# Patient Record
Sex: MALE | Race: WHITE | NOT HISPANIC OR LATINO | Employment: FULL TIME | URBAN - METROPOLITAN AREA
[De-identification: names, ages, dates, MRNs, and addresses within clinical notes are randomized per-mention and may not be internally consistent; named-entity substitution may affect disease eponyms.]

---

## 2018-09-05 ENCOUNTER — RECORDS - HEALTHEAST (OUTPATIENT)
Dept: LAB | Facility: CLINIC | Age: 28
End: 2018-09-05

## 2018-09-05 LAB
ALBUMIN SERPL-MCNC: 4 G/DL (ref 3.5–5)
ALP SERPL-CCNC: 59 U/L (ref 45–120)
ALT SERPL W P-5'-P-CCNC: 24 U/L (ref 0–45)
ANION GAP SERPL CALCULATED.3IONS-SCNC: 8 MMOL/L (ref 5–18)
AST SERPL W P-5'-P-CCNC: 24 U/L (ref 0–40)
BILIRUB SERPL-MCNC: 0.6 MG/DL (ref 0–1)
BUN SERPL-MCNC: 15 MG/DL (ref 8–22)
CALCIUM SERPL-MCNC: 9.8 MG/DL (ref 8.5–10.5)
CHLORIDE BLD-SCNC: 107 MMOL/L (ref 98–107)
CHOLEST SERPL-MCNC: 150 MG/DL
CO2 SERPL-SCNC: 27 MMOL/L (ref 22–31)
CREAT SERPL-MCNC: 0.85 MG/DL (ref 0.7–1.3)
FASTING STATUS PATIENT QL REPORTED: NORMAL
GFR SERPL CREATININE-BSD FRML MDRD: >60 ML/MIN/1.73M2
GLUCOSE BLD-MCNC: 79 MG/DL (ref 70–125)
HDLC SERPL-MCNC: 56 MG/DL
LDLC SERPL CALC-MCNC: 75 MG/DL
POTASSIUM BLD-SCNC: 4.1 MMOL/L (ref 3.5–5)
PROT SERPL-MCNC: 7.1 G/DL (ref 6–8)
SODIUM SERPL-SCNC: 142 MMOL/L (ref 136–145)
TRIGL SERPL-MCNC: 95 MG/DL
TSH SERPL DL<=0.005 MIU/L-ACNC: 0.62 UIU/ML (ref 0.3–5)

## 2020-07-28 ENCOUNTER — HOSPITAL ENCOUNTER (OUTPATIENT)
Dept: GENERAL RADIOLOGY | Facility: CLINIC | Age: 30
Discharge: HOME | End: 2020-07-28
Attending: INTERNAL MEDICINE | Admitting: INTERNAL MEDICINE
Payer: COMMERCIAL

## 2020-07-28 DIAGNOSIS — K21.9 GASTROESOPHAGEAL REFLUX DISEASE WITHOUT ESOPHAGITIS: ICD-10-CM

## 2020-07-28 DIAGNOSIS — R14.2 ERUCTATION: ICD-10-CM

## 2020-07-28 PROCEDURE — 74220 X-RAY XM ESOPHAGUS 1CNTRST: CPT

## 2020-07-28 PROCEDURE — 25500045 ZZH RX 255: Performed by: INTERNAL MEDICINE

## 2020-07-28 RX ADMIN — ANTACID/ANTIFLATULENT 4 G: 380; 550; 10; 10 GRANULE, EFFERVESCENT ORAL at 09:31

## 2020-08-18 ENCOUNTER — ANCILLARY PROCEDURE (OUTPATIENT)
Dept: GENERAL RADIOLOGY | Facility: CLINIC | Age: 30
End: 2020-08-18
Attending: PREVENTIVE MEDICINE
Payer: COMMERCIAL

## 2020-08-18 ENCOUNTER — OFFICE VISIT (OUTPATIENT)
Dept: URGENT CARE | Facility: URGENT CARE | Age: 30
End: 2020-08-18
Payer: COMMERCIAL

## 2020-08-18 VITALS
DIASTOLIC BLOOD PRESSURE: 76 MMHG | SYSTOLIC BLOOD PRESSURE: 120 MMHG | WEIGHT: 133.3 LBS | OXYGEN SATURATION: 100 % | HEART RATE: 65 BPM | TEMPERATURE: 98.8 F

## 2020-08-18 DIAGNOSIS — M79.674 PAIN OF TOE OF RIGHT FOOT: Primary | ICD-10-CM

## 2020-08-18 DIAGNOSIS — M79.674 PAIN OF TOE OF RIGHT FOOT: ICD-10-CM

## 2020-08-18 PROCEDURE — 73660 X-RAY EXAM OF TOE(S): CPT | Mod: RT

## 2020-08-18 PROCEDURE — 99204 OFFICE O/P NEW MOD 45 MIN: CPT | Performed by: PREVENTIVE MEDICINE

## 2020-08-18 RX ORDER — PANTOPRAZOLE SODIUM 40 MG/1
TABLET, DELAYED RELEASE ORAL
COMMUNITY
Start: 2020-06-22 | End: 2024-07-31

## 2020-08-18 RX ORDER — FAMOTIDINE 20 MG/1
TABLET, FILM COATED ORAL
COMMUNITY
Start: 2020-07-22 | End: 2020-12-15

## 2020-08-18 NOTE — PROGRESS NOTES
SUBJECTIVE:  Chief Complaint   Patient presents with     Urgent Care     Toe Injury     probably stub his toe on Saturday afternoon and didn't really hurt until Sunday night. Notice the pain more on Sunday night. Tingling sensation, swelling today     Oniel Smart is a 30 year old adult presents with a chief complaint of right toe(s) great pain.  The injury occurred 2 day(s) ago.   The injury happened while at home. How: stubbed toeimmediate pain.  The patient complained of mild pain  and has had decreased ROM.  Pain exacerbated by weight-bearing and movement.  Relieved by nothing.  Oniel Smart treated it initially with ice, Tylenol and Ibuprofen. This is the first time this type of injury has occurred to this patient.     PMH - GERD    Current Outpatient Medications   Medication Sig Dispense Refill     famotidine (PEPCID) 20 MG tablet        pantoprazole (PROTONIX) 40 MG EC tablet        Social History     Tobacco Use     Smoking status: Never Smoker     Smokeless tobacco: Never Used   Substance Use Topics     Alcohol use: Not on file   No alcohol, no drug use    FH - no osteoporosis    ROS:  Review of systems negative except as stated above.    EXAM:   /76   Pulse 65   Temp 98.8  F (37.1  C) (Tympanic)   Wt 60.5 kg (133 lb 4.8 oz)   SpO2 100%   Gen: healthy,alert,no distress  Extremity: toe(s) great has point tenderness over distal end of toe, FROM and pain with resisted extension.   There is no compromise to the distal circulation. No bruising, minimal swelling, no subungual hematoma, toenail looks fine.  Skin no red or warm.  Nl cap refill, sensation.  Nl cme in toe  Pulses are +2 and CRT is brisk  GENERAL APPEARANCE: healthy, alert and no distress  EXTREMITIES: peripheral pulses normal  SKIN: no suspicious lesions or rashes  NEURO: Normal strength and tone, sensory exam grossly normal, mentation intact and speech normal    X-RAY was done of r great toe.  Tiny avulsion fracture proximal distal  phalanx    ASSESSMENT:   R great toe avulsion fracture  Advise RICE, tylenol, ken tape, post op shoe.  Follow up with orthopedics in next 1-2 days for definitive care.

## 2020-12-14 ASSESSMENT — PATIENT HEALTH QUESTIONNAIRE - PHQ9
SUM OF ALL RESPONSES TO PHQ QUESTIONS 1-9: 11
10. IF YOU CHECKED OFF ANY PROBLEMS, HOW DIFFICULT HAVE THESE PROBLEMS MADE IT FOR YOU TO DO YOUR WORK, TAKE CARE OF THINGS AT HOME, OR GET ALONG WITH OTHER PEOPLE: VERY DIFFICULT
SUM OF ALL RESPONSES TO PHQ QUESTIONS 1-9: 11

## 2020-12-14 ASSESSMENT — ANXIETY QUESTIONNAIRES
2. NOT BEING ABLE TO STOP OR CONTROL WORRYING: MORE THAN HALF THE DAYS
GAD7 TOTAL SCORE: 11
7. FEELING AFRAID AS IF SOMETHING AWFUL MIGHT HAPPEN: NOT AT ALL
4. TROUBLE RELAXING: MORE THAN HALF THE DAYS
5. BEING SO RESTLESS THAT IT IS HARD TO SIT STILL: NOT AT ALL
GAD7 TOTAL SCORE: 11
6. BECOMING EASILY ANNOYED OR IRRITABLE: NEARLY EVERY DAY
3. WORRYING TOO MUCH ABOUT DIFFERENT THINGS: MORE THAN HALF THE DAYS
GAD7 TOTAL SCORE: 11
7. FEELING AFRAID AS IF SOMETHING AWFUL MIGHT HAPPEN: NOT AT ALL
1. FEELING NERVOUS, ANXIOUS, OR ON EDGE: MORE THAN HALF THE DAYS

## 2020-12-15 ENCOUNTER — OFFICE VISIT (OUTPATIENT)
Dept: PEDIATRICS | Facility: CLINIC | Age: 30
End: 2020-12-15
Payer: COMMERCIAL

## 2020-12-15 VITALS
TEMPERATURE: 99.1 F | DIASTOLIC BLOOD PRESSURE: 88 MMHG | WEIGHT: 129.7 LBS | HEART RATE: 58 BPM | OXYGEN SATURATION: 100 % | SYSTOLIC BLOOD PRESSURE: 140 MMHG

## 2020-12-15 DIAGNOSIS — F41.9 ANXIETY: Primary | ICD-10-CM

## 2020-12-15 DIAGNOSIS — K22.4 ESOPHAGEAL DYSMOTILITY: ICD-10-CM

## 2020-12-15 DIAGNOSIS — K21.9 GASTROESOPHAGEAL REFLUX DISEASE, UNSPECIFIED WHETHER ESOPHAGITIS PRESENT: ICD-10-CM

## 2020-12-15 PROCEDURE — 99214 OFFICE O/P EST MOD 30 MIN: CPT | Performed by: FAMILY MEDICINE

## 2020-12-15 PROCEDURE — 96127 BRIEF EMOTIONAL/BEHAV ASSMT: CPT | Performed by: FAMILY MEDICINE

## 2020-12-15 RX ORDER — FAMOTIDINE 40 MG/1
TABLET, FILM COATED ORAL
COMMUNITY
Start: 2020-10-03 | End: 2024-07-31

## 2020-12-15 RX ORDER — NORTRIPTYLINE HCL 25 MG
CAPSULE ORAL
COMMUNITY
Start: 2020-11-23 | End: 2020-12-15 | Stop reason: ALTCHOICE

## 2020-12-15 RX ORDER — ESCITALOPRAM OXALATE 10 MG/1
10 TABLET ORAL DAILY
Qty: 30 TABLET | Refills: 2 | Status: SHIPPED | OUTPATIENT
Start: 2020-12-15 | End: 2021-02-08

## 2020-12-15 SDOH — HEALTH STABILITY: MENTAL HEALTH: HOW OFTEN DO YOU HAVE 6 OR MORE DRINKS ON ONE OCCASION?: NOT ASKED

## 2020-12-15 SDOH — HEALTH STABILITY: MENTAL HEALTH: HOW MANY STANDARD DRINKS CONTAINING ALCOHOL DO YOU HAVE ON A TYPICAL DAY?: NOT ASKED

## 2020-12-15 SDOH — HEALTH STABILITY: MENTAL HEALTH: HOW OFTEN DO YOU HAVE A DRINK CONTAINING ALCOHOL?: 2-3 TIMES A WEEK

## 2020-12-15 ASSESSMENT — PATIENT HEALTH QUESTIONNAIRE - PHQ9: SUM OF ALL RESPONSES TO PHQ QUESTIONS 1-9: 11

## 2020-12-15 ASSESSMENT — ANXIETY QUESTIONNAIRES: GAD7 TOTAL SCORE: 11

## 2020-12-15 NOTE — PROGRESS NOTES
Subjective     Oniel Smart is a 30 year old male who presents to clinic today for the following health issues:    History of Present Illness       Mental Health Follow-up:  Patient presents to follow-up on Depression & Anxiety.Patient's depression since last visit has been:  Medium  The patient is having other symptoms associated with depression.  Patient's anxiety since last visit has been:  Bad  The patient is having other symptoms associated with anxiety.  Any significant life events: health concerns  Patient is feeling anxious or having panic attacks.  Patient has no concerns about alcohol or drug use.     Social History  Tobacco Use    Smoking status: Never Smoker    Smokeless tobacco: Never Used  Alcohol use: Not on file  Drug use: Not on file      Today's PHQ-9         PHQ-9 Total Score:     (P) 11   PHQ-9 Q9 Thoughts of better off dead/self-harm past 2 weeks :   (P) Not at all   Thoughts of suicide or self harm:      Self-harm Plan:        Self-harm Action:          Safety concerns for self or others:           He eats 2-3 servings of fruits and vegetables daily.He consumes 0 sweetened beverage(s) daily.He exercises with enough effort to increase his heart rate 30 to 60 minutes per day.  He exercises with enough effort to increase his heart rate 5 days per week. He is missing 1 dose(s) of medications per week.  He is not taking prescribed medications regularly due to remembering to take.  Answers for HPI/ROS submitted by the patient on 12/14/2020   Chronic problems general questions HPI Form  If you checked off any problems, how difficult have these problems made it for you to do your work, take care of things at home, or get along with other people?: Very difficult  PHQ9 TOTAL SCORE: 11  CINDI 7 TOTAL SCORE: 11         He has a h/o GI issues.  GERD and dysmotility. GI MD tried Nortriptyline 50 mg.  Doesn't feel much improvement.  Meds reviewed.      Patient Active Problem List   Diagnosis     Anxiety      Gastroesophageal reflux disease, unspecified whether esophagitis present     Esophageal dysmotility         REVIEW OF SYSTEMS    No fever  No cough or SOB  No CP  No weakness  No SI      SOCIAL HISTORY    .  Works for a [employer] - I believe from home.      No past medical history on file.      EXAM  BP (!) 140/88   Pulse 58   Temp 99.1  F (37.3  C) (Temporal)   Wt 58.8 kg (129 lb 11.2 oz)   SpO2 100%     Thin man, NAD  Speech clear  Resp non labored  No tremor  Thoughts linear  Affect bright      (F41.9) Anxiety  (primary encounter diagnosis)  Comment:   Plan: escitalopram (LEXAPRO) 10 MG tablet    See instructions.             (K21.9) Gastroesophageal reflux disease, unspecified whether esophagitis present  Comment:   Plan:   Will follow with GI.    (K22.4) Esophageal dysmotility  Comment:   Plan:

## 2020-12-15 NOTE — PATIENT INSTRUCTIONS
Stop Nortriptyline.    Start Lexapro. 1/2 tab per day for first 6 days.    Re check in 4-6 weeks.

## 2020-12-20 PROBLEM — F41.9 ANXIETY: Status: ACTIVE | Noted: 2020-12-20

## 2020-12-20 PROBLEM — K22.4 ESOPHAGEAL DYSMOTILITY: Status: ACTIVE | Noted: 2020-12-20

## 2020-12-20 PROBLEM — K21.9 GASTROESOPHAGEAL REFLUX DISEASE, UNSPECIFIED WHETHER ESOPHAGITIS PRESENT: Status: ACTIVE | Noted: 2020-12-20

## 2021-01-04 ENCOUNTER — HEALTH MAINTENANCE LETTER (OUTPATIENT)
Age: 31
End: 2021-01-04

## 2021-02-01 ASSESSMENT — ANXIETY QUESTIONNAIRES
7. FEELING AFRAID AS IF SOMETHING AWFUL MIGHT HAPPEN: NOT AT ALL
GAD7 TOTAL SCORE: 8
1. FEELING NERVOUS, ANXIOUS, OR ON EDGE: MORE THAN HALF THE DAYS
4. TROUBLE RELAXING: MORE THAN HALF THE DAYS
6. BECOMING EASILY ANNOYED OR IRRITABLE: MORE THAN HALF THE DAYS
3. WORRYING TOO MUCH ABOUT DIFFERENT THINGS: SEVERAL DAYS
7. FEELING AFRAID AS IF SOMETHING AWFUL MIGHT HAPPEN: NOT AT ALL
GAD7 TOTAL SCORE: 8
5. BEING SO RESTLESS THAT IT IS HARD TO SIT STILL: NOT AT ALL
2. NOT BEING ABLE TO STOP OR CONTROL WORRYING: SEVERAL DAYS
GAD7 TOTAL SCORE: 8

## 2021-02-01 ASSESSMENT — PATIENT HEALTH QUESTIONNAIRE - PHQ9
10. IF YOU CHECKED OFF ANY PROBLEMS, HOW DIFFICULT HAVE THESE PROBLEMS MADE IT FOR YOU TO DO YOUR WORK, TAKE CARE OF THINGS AT HOME, OR GET ALONG WITH OTHER PEOPLE: SOMEWHAT DIFFICULT
SUM OF ALL RESPONSES TO PHQ QUESTIONS 1-9: 7
SUM OF ALL RESPONSES TO PHQ QUESTIONS 1-9: 7

## 2021-02-02 ASSESSMENT — ANXIETY QUESTIONNAIRES: GAD7 TOTAL SCORE: 8

## 2021-02-02 ASSESSMENT — PATIENT HEALTH QUESTIONNAIRE - PHQ9: SUM OF ALL RESPONSES TO PHQ QUESTIONS 1-9: 7

## 2021-02-08 ENCOUNTER — OFFICE VISIT (OUTPATIENT)
Dept: PEDIATRICS | Facility: CLINIC | Age: 31
End: 2021-02-08
Payer: COMMERCIAL

## 2021-02-08 VITALS
HEART RATE: 60 BPM | SYSTOLIC BLOOD PRESSURE: 124 MMHG | OXYGEN SATURATION: 100 % | WEIGHT: 132.6 LBS | BODY MASS INDEX: 20.81 KG/M2 | TEMPERATURE: 97.9 F | HEIGHT: 67 IN | DIASTOLIC BLOOD PRESSURE: 84 MMHG

## 2021-02-08 DIAGNOSIS — F41.9 ANXIETY: ICD-10-CM

## 2021-02-08 DIAGNOSIS — Z00.00 ENCOUNTER FOR MEDICAL EXAMINATION TO ESTABLISH CARE: Primary | ICD-10-CM

## 2021-02-08 DIAGNOSIS — N50.89 TESTICULAR MASS: ICD-10-CM

## 2021-02-08 PROCEDURE — 99214 OFFICE O/P EST MOD 30 MIN: CPT | Performed by: INTERNAL MEDICINE

## 2021-02-08 RX ORDER — ESCITALOPRAM OXALATE 10 MG/1
10 TABLET ORAL DAILY
Qty: 90 TABLET | Refills: 1 | Status: SHIPPED | OUTPATIENT
Start: 2021-02-08 | End: 2021-11-02

## 2021-02-08 ASSESSMENT — MIFFLIN-ST. JEOR: SCORE: 1525.1

## 2021-02-08 NOTE — PROGRESS NOTES
Assessment & Plan     1. Encounter for medical examination to establish care  Reviewed history and meds with pt.    2. Anxiety  Improved after starting lexapro x 1 month.  Is focusing on psychosomatic GI issues including esophageal spasms and gerd - sees [facility].  Is open to therapy - will refer.  Refilled meds, f/up Q6 months (next visit can be physical)  - escitalopram (LEXAPRO) 10 MG tablet; Take 1 tablet (10 mg) by mouth daily  Dispense: 90 tablet; Refill: 1  - MENTAL HEALTH REFERRAL  - Adult; Outpatient Treatment; Individual/Couples/Family/Group [place]/Health Psychology[place] [facility] [place] [facility] [phone]; We will contact you to schedule the appointment or please call with any questions    3. Testicular mass  Cystic in nature, likely benign, will order ultrasound for further evaluation.  - US Testicular and Scrotum; Future                           For anxiety management -   - f/up therapy referral, f/up in 6 months    Return in about 6 months (around 8/8/2021) for Preventive Visit.    Julia Delgado MD  [facility] ADRIANA Engle is a 30 year old who presents to clinic today for the following health issues     History of Present Illness       Mental Health Follow-up:  Patient presents to follow-up on Depression & Anxiety.Patient's depression since last visit has been:  No change  The patient is not having other symptoms associated with depression.  Patient's anxiety since last visit has been:  Better  The patient is having other symptoms associated with anxiety.  Any significant life events: No  Patient is feeling anxious or having panic attacks.  Patient has no concerns about alcohol or drug use.     Social History  Tobacco Use    Smoking status: Never Smoker    Smokeless tobacco: Never Used  Alcohol use: Yes    Frequency: 2-3 times a week  Drug use: Never      Today's PHQ-9         PHQ-9 Total Score:     (P) 7   PHQ-9 Q9 Thoughts of better off dead/self-harm past  2 weeks :   (P) Not at all   Thoughts of suicide or self harm:      Self-harm Plan:        Self-harm Action:          Safety concerns for self or others:           He eats 2-3 servings of fruits and vegetables daily.He consumes 0 sweetened beverage(s) daily.He exercises with enough effort to increase his heart rate 30 to 60 minutes per day.  He exercises with enough effort to increase his heart rate 6 days per week. He is missing 1 dose(s) of medications per week.  He is not taking prescribed medications regularly due to remembering to take.     Answers for HPI/ROS submitted by the patient on 2/1/2021   Chronic problems general questions HPI Form  If you checked off any problems, how difficult have these problems made it for you to do your work, take care of things at home, or get along with other people?: Somewhat difficult  PHQ9 TOTAL SCORE: 7  CINDI 7 TOTAL SCORE: 8    Patient stated has a mass on R testicle, Pt would like this looked at. Denies pain/discomfort.    Social History     Tobacco Use     Smoking status: Never Smoker     Smokeless tobacco: Never Used   Substance Use Topics     Alcohol use: Yes     Frequency: 2-3 times a week     Drug use: Never     PHQ 12/14/2020 2/1/2021   PHQ-9 Total Score 11 7   Q9: Thoughts of better off dead/self-harm past 2 weeks Not at all Not at all     CINDI-7 SCORE 12/14/2020 2/1/2021   Total Score 11 (moderate anxiety) 8 (mild anxiety)   Total Score 11 8     Last PHQ-9 2/1/2021   1.  Little interest or pleasure in doing things 2   2.  Feeling down, depressed, or hopeless 1   3.  Trouble falling or staying asleep, or sleeping too much 1   4.  Feeling tired or having little energy 1   5.  Poor appetite or overeating 1   6.  Feeling bad about yourself 0   7.  Trouble concentrating 1   8.  Moving slowly or restless 0   Q9: Thoughts of better off dead/self-harm past 2 weeks 0   PHQ-9 Total Score 7     CINDI-7  2/1/2021   1. Feeling nervous, anxious, or on edge 2   2. Not being able to  "stop or control worrying 1   3. Worrying too much about different things 1   4. Trouble relaxing 2   5. Being so restless that it is hard to sit still 0   6. Becoming easily annoyed or irritable 2   7. Feeling afraid, as if something awful might happen 0   CINDI-7 Total Score 8       Suicide Assessment Five-step Evaluation and Treatment (SAFE-T)    Started lexapro in Dec 2020.  Used to be on citalopram for depression in college.  Improved.  Recently started getting worse, mostly anxiety.      Hx of fructose allergy and bloating symptoms.  Anxiety makes this worse.  Goes to [employer].      Also on famotidine and pantoprazole.    Lexapro has helped with some of the GI symptoms.  Feels better.  Anxiety is improving.  Has done a little therapy in high school.    #2 - testicular mass    New Patient/Transfer of Care    Review of Systems   All other systems on a 10-point review are negative, unless otherwise noted in HPI        Objective    /84 (BP Location: Right arm, Patient Position: Sitting, Cuff Size: Adult Regular)   Pulse 60   Temp 97.9  F (36.6  C) (Temporal)   Ht 1.702 m (5' 7\")   Wt 60.1 kg (132 lb 9.6 oz)   SpO2 100%   BMI 20.77 kg/m    Body mass index is 20.77 kg/m .  Physical Exam   GENERAL: healthy, alert and no distress  EYES: Eyes grossly normal to inspection  NECK: no asymmetry, masses, or scars    MENTAL STATUS EXAM:  Appearance/Behavior: No apparent distress  Speech: Normal  Mood/Affect: anxiety  Insight: Adequate    NEURO: mentation intact and speech normal  MS: no gross musculoskeletal defects noted, no edema  SKIN: no suspicious lesions or rashes   APPEARANCE:[id number]::\"mentation appears normal\",\"affect normal/bright\"}     (male): cystic, mobile 1 cm mass located at anterior medial right testicle.  no hernias and penis normal without urethral discharge    Ultrasound ordered            "

## 2021-02-09 ENCOUNTER — HOSPITAL ENCOUNTER (OUTPATIENT)
Dept: ULTRASOUND IMAGING | Facility: CLINIC | Age: 31
Discharge: HOME | End: 2021-02-09
Attending: INTERNAL MEDICINE | Admitting: INTERNAL MEDICINE
Payer: COMMERCIAL

## 2021-02-09 DIAGNOSIS — N50.89 TESTICULAR MASS: ICD-10-CM

## 2021-02-09 PROCEDURE — 76870 US EXAM SCROTUM: CPT

## 2021-02-10 NOTE — RESULT ENCOUNTER NOTE
Dear Oniel,    Great news!  The ultrasound confirms that your mass is completely benign.  An epididymal cyst (also called a spermatocele) is a fluid filled structure.  It is very common and rarely requires any treatment such as excision, lest you develop pain or complications.    Please feel free to call with any questions.      Sincerely,    Julia Delgado MD

## 2021-02-12 ENCOUNTER — TRANSFERRED RECORDS (OUTPATIENT)
Dept: HEALTH INFORMATION MANAGEMENT | Facility: CLINIC | Age: 31
End: 2021-02-12

## 2021-10-10 ENCOUNTER — HEALTH MAINTENANCE LETTER (OUTPATIENT)
Age: 31
End: 2021-10-10

## 2021-11-01 ASSESSMENT — PATIENT HEALTH QUESTIONNAIRE - PHQ9
10. IF YOU CHECKED OFF ANY PROBLEMS, HOW DIFFICULT HAVE THESE PROBLEMS MADE IT FOR YOU TO DO YOUR WORK, TAKE CARE OF THINGS AT HOME, OR GET ALONG WITH OTHER PEOPLE: VERY DIFFICULT
SUM OF ALL RESPONSES TO PHQ QUESTIONS 1-9: 13
SUM OF ALL RESPONSES TO PHQ QUESTIONS 1-9: 13

## 2021-11-01 ASSESSMENT — ANXIETY QUESTIONNAIRES
GAD7 TOTAL SCORE: 15
3. WORRYING TOO MUCH ABOUT DIFFERENT THINGS: NEARLY EVERY DAY
GAD7 TOTAL SCORE: 15
8. IF YOU CHECKED OFF ANY PROBLEMS, HOW DIFFICULT HAVE THESE MADE IT FOR YOU TO DO YOUR WORK, TAKE CARE OF THINGS AT HOME, OR GET ALONG WITH OTHER PEOPLE?: VERY DIFFICULT
4. TROUBLE RELAXING: NEARLY EVERY DAY
5. BEING SO RESTLESS THAT IT IS HARD TO SIT STILL: SEVERAL DAYS
GAD7 TOTAL SCORE: 15
1. FEELING NERVOUS, ANXIOUS, OR ON EDGE: NEARLY EVERY DAY
7. FEELING AFRAID AS IF SOMETHING AWFUL MIGHT HAPPEN: SEVERAL DAYS
2. NOT BEING ABLE TO STOP OR CONTROL WORRYING: NEARLY EVERY DAY
7. FEELING AFRAID AS IF SOMETHING AWFUL MIGHT HAPPEN: SEVERAL DAYS
6. BECOMING EASILY ANNOYED OR IRRITABLE: SEVERAL DAYS

## 2021-11-02 ENCOUNTER — MYC MEDICAL ADVICE (OUTPATIENT)
Dept: PEDIATRICS | Facility: CLINIC | Age: 31
End: 2021-11-02

## 2021-11-02 ENCOUNTER — OFFICE VISIT (OUTPATIENT)
Dept: PEDIATRICS | Facility: CLINIC | Age: 31
End: 2021-11-02
Payer: COMMERCIAL

## 2021-11-02 VITALS
OXYGEN SATURATION: 100 % | RESPIRATION RATE: 16 BRPM | HEIGHT: 67 IN | BODY MASS INDEX: 21.82 KG/M2 | HEART RATE: 61 BPM | TEMPERATURE: 97.4 F | WEIGHT: 139 LBS | SYSTOLIC BLOOD PRESSURE: 134 MMHG | DIASTOLIC BLOOD PRESSURE: 76 MMHG

## 2021-11-02 DIAGNOSIS — F41.9 ANXIETY: Primary | ICD-10-CM

## 2021-11-02 DIAGNOSIS — K21.9 GASTROESOPHAGEAL REFLUX DISEASE, UNSPECIFIED WHETHER ESOPHAGITIS PRESENT: ICD-10-CM

## 2021-11-02 DIAGNOSIS — F41.9 ANXIETY: ICD-10-CM

## 2021-11-02 DIAGNOSIS — Z23 NEED FOR PROPHYLACTIC VACCINATION AND INOCULATION AGAINST INFLUENZA: ICD-10-CM

## 2021-11-02 PROCEDURE — 90471 IMMUNIZATION ADMIN: CPT | Performed by: INTERNAL MEDICINE

## 2021-11-02 PROCEDURE — 90686 IIV4 VACC NO PRSV 0.5 ML IM: CPT | Performed by: INTERNAL MEDICINE

## 2021-11-02 PROCEDURE — 99215 OFFICE O/P EST HI 40 MIN: CPT | Mod: 25 | Performed by: INTERNAL MEDICINE

## 2021-11-02 RX ORDER — BUSPIRONE HYDROCHLORIDE 5 MG/1
5 TABLET ORAL 2 TIMES DAILY
Qty: 120 TABLET | Refills: 0 | Status: SHIPPED | OUTPATIENT
Start: 2021-11-02 | End: 2021-11-22

## 2021-11-02 ASSESSMENT — ANXIETY QUESTIONNAIRES: GAD7 TOTAL SCORE: 15

## 2021-11-02 ASSESSMENT — PATIENT HEALTH QUESTIONNAIRE - PHQ9: SUM OF ALL RESPONSES TO PHQ QUESTIONS 1-9: 13

## 2021-11-02 ASSESSMENT — MIFFLIN-ST. JEOR: SCORE: 1549.13

## 2021-11-02 NOTE — PROGRESS NOTES
Assessment & Plan     Anxiety  Not well controlled.  Off of lexapro (ran out of meds) but also did not notice much benefit on 10 mg of lexapro.  Of note, has been on zoloft in past for treatment of depression (not anxiety) with good response.  I suspect he may respond better to zoloft, despite underlying IBS with diarrhea (pt also in agreement here).  Will also start buspar, as anxiety is quite severe.  Will refer to [facility].    Instructions provided to self-titrate dose based on benefits and side effects.     Instructed to update me in a couple of weeks regarding benefits, side effects, and current dose via mychart of phone prior to refills.    If all is well, will follow-up via video visit in 4-8 weeks, sooner if symptoms worsen or as needed.      - busPIRone (BUSPAR) 5 MG tablet; Take 1 tablet (5 mg) by mouth 2 times daily Increase by 5 mg/day every 2-3 days to a maximum of 15 mg twice daily  - sertraline (ZOLOFT) 50 MG tablet; Take 0.5 tablets (25 mg) by mouth daily for 7 days, THEN 1 tablet (50 mg) daily for 23 days.    Gastroesophageal reflux disease, unspecified whether esophagitis present  Has been seen by GI and had full work-up.  They suspect his symptoms are psychosomatic and funcitonal in nature - he is in agreement and having trouble finding someone who specializes in functional GI health.    I recommend we focus on managing anxiety as this will likely help GI symptoms in turn.  Pt in agreement with this plan.      I spent a total of 40 minutes on the day of the visit.   Time spent doing chart review, history and exam, documentation and further activities per the note       Depression Screening Follow Up    PHQ 11/1/2021   PHQ-9 Total Score 13   Q9: Thoughts of better off dead/self-harm past 2 weeks Not at all         Follow Up Actions Taken       Patient Instructions   Instructions for antidepressant initiation:    Begin medications (sertraline and buspirone) and increase dose per instructions (see  below).  Monitor for side effects and increase dose only as tolerated.  Referral to our behavioral health clinician to get started on therapy.    Prior to refills, I will contact you via [employer] in a couple of weeks to check-in regarding any benefits, side effects, and how you feel about your current dose.    If all is well, follow-up in clinic in 4-8 weeks.  Make a sooner appointment if symptoms worsen or if side effects are intolerable.        Return in about 6 weeks (around 12/14/2021) for Video Visit.    Julia Delgado MD  [facility] ADRIANA Engle is a 31 year old who presents for the following health issues       History of Present Illness       Mental Health Follow-up:  Patient presents to follow-up on Anxiety.    Patient's anxiety since last visit has been:  Worse  The patient is having other symptoms associated with anxiety.  Any significant life events: health concerns  Patient is feeling anxious or having panic attacks.  Patient has no concerns about alcohol or drug use.     Social History  Tobacco Use    Smoking status: Never Smoker    Smokeless tobacco: Never Used  Alcohol use: Yes    Comment: socially  Drug use: Never      Today's PHQ-9         PHQ-9 Total Score:     (P) 13   PHQ-9 Q9 Thoughts of better off dead/self-harm past 2 weeks :   (P) Not at all   Thoughts of suicide or self harm:      Self-harm Plan:        Self-harm Action:          Safety concerns for self or others:           He eats 2-3 servings of fruits and vegetables daily.He consumes 0 sweetened beverage(s) daily.He exercises with enough effort to increase his heart rate 30 to 60 minutes per day.  He exercises with enough effort to increase his heart rate 5 days per week.   He is taking medications regularly.      Social History     Tobacco Use     Smoking status: Never Smoker     Smokeless tobacco: Never Used   Substance Use Topics     Alcohol use: Yes     Comment: socially     Drug use: Never     PHQ  "12/14/2020 2/1/2021 11/1/2021   PHQ-9 Total Score 11 7 13   Q9: Thoughts of better off dead/self-harm past 2 weeks Not at all Not at all Not at all     CINDI-7 SCORE 12/14/2020 2/1/2021 11/1/2021   Total Score 11 (moderate anxiety) 8 (mild anxiety) 15 (severe anxiety)   Total Score 11 8 15     Last PHQ-9 11/1/2021   1.  Little interest or pleasure in doing things 2   2.  Feeling down, depressed, or hopeless 2   3.  Trouble falling or staying asleep, or sleeping too much 2   4.  Feeling tired or having little energy 2   5.  Poor appetite or overeating 2   6.  Feeling bad about yourself 1   7.  Trouble concentrating 2   8.  Moving slowly or restless 0   Q9: Thoughts of better off dead/self-harm past 2 weeks 0   PHQ-9 Total Score 13     CINDI-7  11/1/2021   1. Feeling nervous, anxious, or on edge 3   2. Not being able to stop or control worrying 3   3. Worrying too much about different things 3   4. Trouble relaxing 3   5. Being so restless that it is hard to sit still 1   6. Becoming easily annoyed or irritable 1   7. Feeling afraid, as if something awful might happen 1   CINDI-7 Total Score 15       Suicide Assessment Five-step Evaluation and Treatment (SAFE-T)      Sertraline in past - helpful for depression - took in highschool and through college.  Nortriptyline for a short period - no effect, stopped.  Lexapro 10 mg - didn't think this was helpful.        Review of Systems   All other systems on a 10-point review are negative, unless otherwise noted in HPI        Objective    /76 (BP Location: Right arm, Patient Position: Sitting, Cuff Size: Adult Regular)   Pulse 61   Temp 97.4  F (36.3  C) (Tympanic)   Resp 16   Ht 1.702 m (5' 7\")   Wt 63 kg (139 lb)   SpO2 100%   BMI 21.77 kg/m    Body mass index is 21.77 kg/m .  Physical Exam   GENERAL: healthy, alert and no distress  EYES: Eyes grossly normal to inspection  NECK: no asymmetry, masses, or scars    MENTAL STATUS EXAM:  Appearance/Behavior: No apparent " distress and Neatly groomed  Speech: Normal  Mood/Affect: anxiety  Insight: Adequate    NEURO: mentation intact and speech normal  MS: no gross musculoskeletal defects noted, no edema  SKIN: no suspicious lesions or rashes                  Answers for HPI/ROS submitted by the patient on 11/1/2021  If you checked off any problems, how difficult have these problems made it for you to do your work, take care of things at home, or get along with other people?: Very difficult  PHQ9 TOTAL SCORE: 13  CINDI 7 TOTAL SCORE: 15

## 2021-11-02 NOTE — PATIENT INSTRUCTIONS
Instructions for antidepressant initiation:    Begin medications (sertraline and buspirone) and increase dose per instructions (see below).  Monitor for side effects and increase dose only as tolerated.  Referral to our behavioral health clinician to get started on therapy.    Prior to refills, I will contact you via happnt in a couple of weeks to check-in regarding any benefits, side effects, and how you feel about your current dose.    If all is well, follow-up in clinic in 4-8 weeks.  Make a sooner appointment if symptoms worsen or if side effects are intolerable.

## 2021-11-02 NOTE — Clinical Note
Anxiety - getting worse but has been off of meds (however didn't really help) has psychosomatic manifestations too.

## 2021-11-03 ENCOUNTER — TELEPHONE (OUTPATIENT)
Dept: BEHAVIORAL HEALTH | Facility: CLINIC | Age: 31
End: 2021-11-03
Payer: COMMERCIAL

## 2021-11-03 NOTE — TELEPHONE ENCOUNTER
Attempted to call patient after receiving a referral from their PCP. Message left with a contact number for them to call back.    Patient called back on 11/04/2021 and left a message.    Contacted patient due to referral by their PCP for potential [facility] services. [facility] introduced herself and her role within the clinic. Patient is in agreement with meeting and an appointment was scheduled for 11/29/2021. Patient denies any other needs or safety concerns at this time and was provided with contact information for this [facility].    [facility] Clinician

## 2021-11-22 RX ORDER — BUSPIRONE HYDROCHLORIDE 5 MG/1
20 TABLET ORAL 2 TIMES DAILY
Qty: 240 TABLET | Refills: 0 | Status: SHIPPED | OUTPATIENT
Start: 2021-11-22 | End: 2021-12-10

## 2021-11-28 ASSESSMENT — ANXIETY QUESTIONNAIRES
GAD7 TOTAL SCORE: 10
7. FEELING AFRAID AS IF SOMETHING AWFUL MIGHT HAPPEN: NOT AT ALL
3. WORRYING TOO MUCH ABOUT DIFFERENT THINGS: MORE THAN HALF THE DAYS
6. BECOMING EASILY ANNOYED OR IRRITABLE: SEVERAL DAYS
5. BEING SO RESTLESS THAT IT IS HARD TO SIT STILL: NOT AT ALL
1. FEELING NERVOUS, ANXIOUS, OR ON EDGE: NEARLY EVERY DAY
7. FEELING AFRAID AS IF SOMETHING AWFUL MIGHT HAPPEN: NOT AT ALL
4. TROUBLE RELAXING: MORE THAN HALF THE DAYS
8. IF YOU CHECKED OFF ANY PROBLEMS, HOW DIFFICULT HAVE THESE MADE IT FOR YOU TO DO YOUR WORK, TAKE CARE OF THINGS AT HOME, OR GET ALONG WITH OTHER PEOPLE?: VERY DIFFICULT
2. NOT BEING ABLE TO STOP OR CONTROL WORRYING: MORE THAN HALF THE DAYS

## 2021-11-28 ASSESSMENT — PATIENT HEALTH QUESTIONNAIRE - PHQ9
10. IF YOU CHECKED OFF ANY PROBLEMS, HOW DIFFICULT HAVE THESE PROBLEMS MADE IT FOR YOU TO DO YOUR WORK, TAKE CARE OF THINGS AT HOME, OR GET ALONG WITH OTHER PEOPLE: VERY DIFFICULT
SUM OF ALL RESPONSES TO PHQ QUESTIONS 1-9: 9
SUM OF ALL RESPONSES TO PHQ QUESTIONS 1-9: 9

## 2021-11-29 ENCOUNTER — VIRTUAL VISIT (OUTPATIENT)
Dept: BEHAVIORAL HEALTH | Facility: CLINIC | Age: 31
End: 2021-11-29
Payer: COMMERCIAL

## 2021-11-29 DIAGNOSIS — F41.1 GENERALIZED ANXIETY DISORDER: Primary | ICD-10-CM

## 2021-11-29 PROCEDURE — 90837 PSYTX W PT 60 MINUTES: CPT | Mod: GT | Performed by: SOCIAL WORKER

## 2021-11-29 ASSESSMENT — COLUMBIA-SUICIDE SEVERITY RATING SCALE - C-SSRS
TOTAL  NUMBER OF ABORTED OR SELF INTERRUPTED ATTEMPTS PAST 3 MONTHS: NO
4. HAVE YOU HAD THESE THOUGHTS AND HAD SOME INTENTION OF ACTING ON THEM?: NO
2. HAVE YOU ACTUALLY HAD ANY THOUGHTS OF KILLING YOURSELF LIFETIME?: NO
ATTEMPT PAST THREE MONTHS: NO
1. IN THE PAST MONTH, HAVE YOU WISHED YOU WERE DEAD OR WISHED YOU COULD GO TO SLEEP AND NOT WAKE UP?: NO
TOTAL  NUMBER OF ABORTED OR SELF INTERRUPTED ATTEMPTS PAST LIFETIME: NO
5. HAVE YOU STARTED TO WORK OUT OR WORKED OUT THE DETAILS OF HOW TO KILL YOURSELF? DO YOU INTEND TO CARRY OUT THIS PLAN?: NO
3. HAVE YOU BEEN THINKING ABOUT HOW YOU MIGHT KILL YOURSELF?: NO
4. HAVE YOU HAD THESE THOUGHTS AND HAD SOME INTENTION OF ACTING ON THEM?: NO
2. HAVE YOU ACTUALLY HAD ANY THOUGHTS OF KILLING YOURSELF?: NO
6. HAVE YOU EVER DONE ANYTHING, STARTED TO DO ANYTHING, OR PREPARED TO DO ANYTHING TO END YOUR LIFE?: NO
TOTAL  NUMBER OF INTERRUPTED ATTEMPTS PAST 3 MONTHS: NO
6. HAVE YOU EVER DONE ANYTHING, STARTED TO DO ANYTHING, OR PREPARED TO DO ANYTHING TO END YOUR LIFE?: NO
TOTAL  NUMBER OF INTERRUPTED ATTEMPTS LIFETIME: NO
1. IN THE PAST MONTH, HAVE YOU WISHED YOU WERE DEAD OR WISHED YOU COULD GO TO SLEEP AND NOT WAKE UP?: NO
ATTEMPT LIFETIME: NO
5. HAVE YOU STARTED TO WORK OUT OR WORKED OUT THE DETAILS OF HOW TO KILL YOURSELF? DO YOU INTEND TO CARRY OUT THIS PLAN?: NO

## 2021-11-29 ASSESSMENT — ANXIETY QUESTIONNAIRES: GAD7 TOTAL SCORE: 10

## 2021-11-29 ASSESSMENT — PATIENT HEALTH QUESTIONNAIRE - PHQ9: SUM OF ALL RESPONSES TO PHQ QUESTIONS 1-9: 9

## 2021-12-02 NOTE — PROGRESS NOTES
Telemedicine Visit: The patient's condition can be safely assessed and treated via synchronous audio and visual telemedicine encounter.      Reason for Telemedicine Visit: Services only offered telehealth    Originating Site (Patient Location): Patient's home    Distant Site (Provider Location): [facility]: [place]    Consent:  The patient/guardian has verbally consented to: the potential risks and benefits of telemedicine (video visit) versus in person care; bill my insurance or make self-payment for services provided; and responsibility for payment of non-covered services.     Mode of Communication:  Video Conference via [employer]    As the provider I attest to compliance with applicable laws and regulations related to telemedicine.    [facility] [place] Primary Care: Integrated Behavioral Health  November 29, 2021    Behavioral Health Clinician Progress Note    Patient Name: Oniel Smart         Service Type:  Individual      Service Location:   MyChart / Email (patient reached)     Session Start Time: 10:00 am  Session End Time: 11:00 am      Session Length: 53 - 60      Attendees: Patient    Visit Activities (Refresh list every visit): NEW and [facility] Only    Diagnostic Assessment Date: Pending  Treatment Plan Review Date: 03/01/2022  See Flowsheets for today's PHQ-9 and CINDI-7 results  Previous PHQ-9:   PHQ-9 SCORE 2/1/2021 11/1/2021 11/28/2021   PHQ-9 Total Score MyChart 7 (Mild depression) 13 (Moderate depression) 9 (Mild depression)   PHQ-9 Total Score 7 13 9     Previous CINDI-7:   CINDI-7 SCORE 2/1/2021 11/1/2021 11/28/2021   Total Score 8 (mild anxiety) 15 (severe anxiety) 10 (moderate anxiety)   Total Score 8 15 10       YUMI LEVEL:  YUMI Score (Last Two) 12/14/2020   YUMI Raw Score 36   Activation Score 75.5   YUMI Level 4       DATA  Extended Session (60+ minutes): No  Interactive Complexity: No  Crisis: No  [facility] Patient: No    Treatment Objective(s) Addressed in This Session:  Target  "Behavior(s): disease management/lifestyle changes related to anxiety    Anxiety: will develop more effective coping skills to manage anxiety symptoms and will develop healthy cognitive patterns and beliefs    Current Stressors / Issues:  Patient presents with an anxious affect. Patient reports he is struggled with anxiety ever since he was a teenager and has a past history of depression. Patient responded well to medication and is hopeful that can assist at this time as well. Patient describes that he has ongoing stomach issues including a diagnosis of reflux disorder. Patient describes that he will feel bloated and get burping attacks, especially when around others. Patient also describes feeling foggy and tired all the time. Patient is fearful about eating around others or being out in public as he may get an attack and be unable to stop it. Patient feels that his physical symptoms and his anxiety do cause impairment in his social life and work. Patient describes both ruminating and intrusive thoughts and getting stuck\" circular thinking.\" Patient notices this happens at home, such as doing chores, and he is unable to redirect himself. Patient also identifies a perfectionist, obsessive thought process. Patient does get restless and will fidget by downstairs like to manage his anxiety.  [facility] validated patient for his experience. [facility] provided education about anxiety and how it connects with patient's mental and physical symptoms. [facility] educated about Radical Acceptance and patient starting to practice this thought process regarding his medical diagnoses. [facility] processed patient using diaphragmatic breathing and how this could relate to 4 count breathing to manage anxiety. [facility] had patient practice this in session and discussed how patient can use this along with grounding skills. [facility] encouraged patient to practice these new skills when calm and especially before entering the eating process.    Progress on Treatment " Objective(s) / Homework:  New Objective established this session - PREPARATION (Decided to change - considering how); Intervened by negotiating a change plan and determining options / strategies for behavior change, identifying triggers, exploring social supports, and working towards setting a date to begin behavior change      Situation    anxious thoughts and burping episodes     Automatic Thoughts  Cognitive Distortions    others will notice, they will make judgments   Feelings    embarrassed, anxious, fearful, panicked     Behavior    avoiding eating in public, limiting intake around others     Questioning Thoughts            Also provided psychoeducation about behavioral health condition, symptoms, and treatment options    Care Plan review completed: No    Medication Review:  Changes to psychiatric medications, see updated Medication List in EPIC.     Medication Compliance:  Yes    Changes in Health Issues:   Yes: Reflux disease, Associated Psychological Distress    Chemical Use Review:   Substance Use: Chemical use reviewed, no active concerns identified      Tobacco Use: No current tobacco use.      Assessment: Current Emotional / Mental Status (status of significant symptoms):  Risk status (Self / Other harm or suicidal ideation)  Patient denies a history of suicidal ideation, suicide attempts, self-injurious behavior, homicidal ideation, homicidal behavior and and other safety concerns  Patient denies current fears or concerns for personal safety.  Patient denies current or recent suicidal ideation or behaviors.  Patient denies current or recent homicidal ideation or behaviors.  Patient denies current or recent self injurious behavior or ideation.  Patient denies other safety concerns.  A safety and risk management plan has not been developed at this time, however patient was encouraged to call [facility] should there be a change in any of these risk factors.    Appearance:   Appropriate   Eye  Contact:   Good   Psychomotor Behavior: Normal   Attitude:   Cooperative  Interested  Orientation:   All  Speech   Rate / Production: Normal    Volume:  Normal   Mood:    Anxious   Affect:    Appropriate   Thought Content:  Clear   Thought Form:  Coherent  Logical   Insight:    Good     Diagnoses:  1. Generalized anxiety disorder      Collateral Reports Completed:  Not Applicable    Plan: (Homework, other): Patient will practice 4 count breathing and grounding skills.  Patient was given information about behavioral services and encouraged to schedule a follow up appointment with the clinic [facility] in 2 weeks.  He was also given information about mental health symptoms and treatment options .  CD Recommendations: No indications of CD issues. __________________________________________________________________    Integrated Primary Care Behavioral Health Treatment Plan    Patient's Name: Oniel Smart  YOB: 1990    Date: 11/29/2021    DSM-V Diagnoses: 300.02 (F41.1) Generalized Anxiety Disorder  Psychosocial / Contextual Factors: Patient was referred by his PCP due to an increase in anxiety related to his medical condition and it causes some impairment in his life.  WHODAS: 16    Referral / Collaboration:  Referral to another professional/service is not indicated at this time. Patient will be referred to long-term therapy if needed.    Anticipated number of session or this episode of care: As needed with this [facility]    MeasurableTreatment Goal(s) related to diagnosis / functional impairment(s)  Goal 1: Patient will report a decrease in his anxiety related to eating around others.    I will know I've met my goal when I am less worried about having a burping attack.      Objective #A (Patient Action)    Patient will identify Rational and irrational fears / thoughts that contribute to feeling anxious.  Status: New - Date: 11/29/2021     Intervention(s)  [facility] will teach emotional recognition/identification.  CBT.    Objective #B  Patient will use cognitive strategies identified in therapy to challenge anxious thoughts.  Status: New - Date: 11/29/2021     Intervention(s)  [facility] will teach emotional regulation skills. CBT, mindfulness.    Patient has reviewed and agreed to the above plan.    MEHRDAD Mena  November 29, 2021    Answers for HPI/ROS submitted by the patient on 11/28/2021  If you checked off any problems, how difficult have these problems made it for you to do your work, take care of things at home, or get along with other people?: Very difficult  PHQ9 TOTAL SCORE: 9  CINDI 7 TOTAL SCORE: 10

## 2021-12-09 ASSESSMENT — ANXIETY QUESTIONNAIRES
6. BECOMING EASILY ANNOYED OR IRRITABLE: MORE THAN HALF THE DAYS
3. WORRYING TOO MUCH ABOUT DIFFERENT THINGS: MORE THAN HALF THE DAYS
1. FEELING NERVOUS, ANXIOUS, OR ON EDGE: NEARLY EVERY DAY
GAD7 TOTAL SCORE: 10
4. TROUBLE RELAXING: SEVERAL DAYS
5. BEING SO RESTLESS THAT IT IS HARD TO SIT STILL: SEVERAL DAYS
GAD7 TOTAL SCORE: 10
7. FEELING AFRAID AS IF SOMETHING AWFUL MIGHT HAPPEN: NOT AT ALL
2. NOT BEING ABLE TO STOP OR CONTROL WORRYING: SEVERAL DAYS
GAD7 TOTAL SCORE: 10
7. FEELING AFRAID AS IF SOMETHING AWFUL MIGHT HAPPEN: NOT AT ALL

## 2021-12-09 ASSESSMENT — PATIENT HEALTH QUESTIONNAIRE - PHQ9
10. IF YOU CHECKED OFF ANY PROBLEMS, HOW DIFFICULT HAVE THESE PROBLEMS MADE IT FOR YOU TO DO YOUR WORK, TAKE CARE OF THINGS AT HOME, OR GET ALONG WITH OTHER PEOPLE: SOMEWHAT DIFFICULT
SUM OF ALL RESPONSES TO PHQ QUESTIONS 1-9: 8
SUM OF ALL RESPONSES TO PHQ QUESTIONS 1-9: 8

## 2021-12-10 ENCOUNTER — VIRTUAL VISIT (OUTPATIENT)
Dept: PEDIATRICS | Facility: CLINIC | Age: 31
End: 2021-12-10
Payer: COMMERCIAL

## 2021-12-10 DIAGNOSIS — F41.9 ANXIETY: ICD-10-CM

## 2021-12-10 PROCEDURE — 99214 OFFICE O/P EST MOD 30 MIN: CPT | Mod: GT | Performed by: INTERNAL MEDICINE

## 2021-12-10 RX ORDER — BUSPIRONE HYDROCHLORIDE 15 MG/1
TABLET ORAL
Qty: 90 TABLET | Refills: 1 | Status: SHIPPED | OUTPATIENT
Start: 2021-12-10 | End: 2022-02-04

## 2021-12-10 RX ORDER — SERTRALINE HYDROCHLORIDE 100 MG/1
100 TABLET, FILM COATED ORAL DAILY
Qty: 90 TABLET | Refills: 0 | Status: SHIPPED | OUTPATIENT
Start: 2021-12-10 | End: 2022-02-04

## 2021-12-10 ASSESSMENT — PATIENT HEALTH QUESTIONNAIRE - PHQ9: SUM OF ALL RESPONSES TO PHQ QUESTIONS 1-9: 8

## 2021-12-10 ASSESSMENT — ANXIETY QUESTIONNAIRES: GAD7 TOTAL SCORE: 10

## 2021-12-10 NOTE — PROGRESS NOTES
Oniel is a 31 year old who is being evaluated via a billable video visit.      How would you like to obtain your AVS? MyChart  If the video visit is dropped, the invitation should be resent by: Text to cell phone: see chart  Will anyone else be joining your video visit? No    Video Start Time: 9:06 AM    Assessment & Plan       ICD-10-CM    1. Anxiety  F41.9 sertraline (ZOLOFT) 100 MG tablet     busPIRone (BUSPAR) 15 MG tablet     Assessment: anxiety improved, but still present.  CINDI scores are unchanged.  In therapy and helpful.  See PI for plan and f/up.       Time spent doing chart review, history and exam, documentation and further activities per the note       Patient Instructions   1. Increase zoloft to 100 mg daily - will prescribe 100 mg tabs  2. Buspar 15 mg twice daily scheduled.  Can take an additional 15 mg daily for breakthrough anxiety.  3. F/up in 8 weeks      Return in 8 weeks (on 2/4/2022).    Julia Delgado MD  [facility]    Jim Engle is a 31 year old who presents for the following health issues     HPI     Switched medications - started zoloft instead of lexapro and started buspar  Zoloft 50 mg - working well  Buspar - takes 30 daily total, usually 15 in the am, 5 mg in afternoon, 10 at night  Feels more calm, still dealing with daily anxiety  Still with abdominal pains and nausea - cannot tell if these are IBS, gerd, medication side effects, anxiety.    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   Beginning of the year is busy season    Started therapy with [facility] - feels hopeful that this will be helpful      PHQ 11/1/2021 11/28/2021 12/9/2021   PHQ-9 Total Score 13 9 8   Q9: Thoughts of better off dead/self-harm past 2 weeks Not at all Not at all Not at all     CINDI-7 SCORE 11/1/2021 11/28/2021 12/9/2021   Total Score 15 (severe anxiety) 10 (moderate anxiety) 10 (moderate anxiety)   Total Score 15 10 10               Review of Systems   All other systems on a 10-point  review are negative, unless otherwise noted in HPI        Objective           Vitals:  No vitals were obtained today due to virtual visit.    Physical Exam   GENERAL: Healthy, alert and no distress  EYES: Eyes grossly normal to inspection.  No discharge or erythema, or obvious scleral/conjunctival abnormalities.  RESP: No audible wheeze, cough, or visible cyanosis.  No visible retractions or increased work of breathing.    SKIN: Visible skin clear. No significant rash, abnormal pigmentation or lesions.  NEURO: Cranial nerves grossly intact.  Mentation and speech appropriate for age.  PSYCH: Mentation appears normal, affect normal/bright, judgement and insight intact, normal speech and appearance well-groomed.                Video-Visit Details    Type of service:  Video Visit    Video End Time:9:32 AM    Originating Location (pt. Location): Home    Distant Location (provider location):  [facility] [employer]     Platform used for Video Visit: [employer]  Answers for HPI/ROS submitted by the patient on 12/9/2021  If you checked off any problems, how difficult have these problems made it for you to do your work, take care of things at home, or get along with other people?: Somewhat difficult  PHQ9 TOTAL SCORE: 8  CINDI 7 TOTAL SCORE: 10

## 2021-12-10 NOTE — PATIENT INSTRUCTIONS
1. Increase zoloft to 100 mg daily - will prescribe 100 mg tabs  2. Buspar 15 mg twice daily scheduled.  Can take an additional 15 mg daily for breakthrough anxiety.  3. F/up in 8 weeks

## 2021-12-27 ENCOUNTER — VIRTUAL VISIT (OUTPATIENT)
Dept: BEHAVIORAL HEALTH | Facility: CLINIC | Age: 31
End: 2021-12-27
Payer: COMMERCIAL

## 2021-12-27 DIAGNOSIS — F41.1 GENERALIZED ANXIETY DISORDER: Primary | ICD-10-CM

## 2021-12-27 PROCEDURE — 90832 PSYTX W PT 30 MINUTES: CPT | Mod: GT | Performed by: SOCIAL WORKER

## 2021-12-28 NOTE — PROGRESS NOTES
Telemedicine Visit: The patient's condition can be safely assessed and treated via synchronous audio and visual telemedicine encounter.      Reason for Telemedicine Visit: Services only offered telehealth    Originating Site (Patient Location): Patient's home    Distant Site (Provider Location): [facility]: [place]    Consent:  The patient/guardian has verbally consented to: the potential risks and benefits of telemedicine (video visit) versus in person care; bill my insurance or make self-payment for services provided; and responsibility for payment of non-covered services.     Mode of Communication:  Video Conference via [employer]    As the provider I attest to compliance with applicable laws and regulations related to telemedicine.    [facility] [place] Primary Care: Integrated Behavioral Health  December 27, 2021    Behavioral Health Clinician Progress Note    Patient Name: Oniel Smart         Service Type:  Individual      Service Location:   MyChart / Email (patient reached)     Session Start Time: 16:30  Session End Time: 17:01      Session Length: 16 - 37      Attendees: Patient    Visit Activities (Refresh list every visit): [facility] Only    Diagnostic Assessment Date: Pending  Treatment Plan Review Date: 03/01/2022  See Flowsheets for today's PHQ-9 and CINDI-7 results  Previous PHQ-9:   PHQ-9 SCORE 11/1/2021 11/28/2021 12/9/2021   PHQ-9 Total Score MyChart 13 (Moderate depression) 9 (Mild depression) 8 (Mild depression)   PHQ-9 Total Score 13 9 8     Previous CINDI-7:   CINDI-7 SCORE 11/1/2021 11/28/2021 12/9/2021   Total Score 15 (severe anxiety) 10 (moderate anxiety) 10 (moderate anxiety)   Total Score 15 10 10       YUMI LEVEL:  YUMI Score (Last Two) 12/14/2020   YUMI Raw Score 36   Activation Score 75.5   YUMI Level 4       DATA  Extended Session (60+ minutes): No  Interactive Complexity: No  Crisis: No  [facility] Patient: No    Treatment Objective(s) Addressed in This Session:  Target  Behavior(s): disease management/lifestyle changes related to anxiety    Anxiety: will develop more effective coping skills to manage anxiety symptoms and will develop healthy cognitive patterns and beliefs    Current Stressors / Issues:  Patient reports he continues to experience high levels of anxiety and attributes this to the holidays and social get-togethers.  Patient did notice he was a little more at ease around family but continues to struggle with spasms and burping episodes.  Patient felt that he had to constantly be thinking of what to do and having an exit plan.  Patient reports he did try Radical Acceptance and did not find it helpful.  Patient was able to use 4-count breathing a little and found that this did assist with some of his anxiety.  Patient admits that he could use it more.  Patient continues to feel frustrated regarding his medical condition and how it limits his eating and social interactions.  Patient reports he does feel well when he is running and is able to avoid spasm episodes for several hours afterwards.  [facility] validated patient for his ongoing distress.  [facility] praised patient for being willing to try the skills discussed last session and finding value in the breathing.  [facility] discussed patient setting reminders or incorporating breathing practices into his daily routine in order to to use it more.  [facility] processed patient's ongoing frustrations regarding his medical condition and trying to work towards acceptance rather than fighting against his diagnosis.  [facility] processed patient knowing that he is a perfectionist and how he can start to focus on mindful thinking and living, being in the moment rather than always anticipating and being in the future.  [facility] educated about mindfulness and how patient can practice this, using the example of his running.    Progress on Treatment Objective(s) / Homework:  Minimal progress - ACTION (Actively working towards change); Intervened by reinforcing change  plan / affirming steps taken    Situation    anxious thoughts and burping episodes     Automatic Thoughts  Cognitive Distortions    others will notice, they will make judgments   Feelings    embarrassed, anxious, fearful, panicked     Behavior    avoiding eating in public, limiting intake around others     Questioning Thoughts   What can I control right now? Notice in the moment         Also provided psychoeducation about behavioral health condition, symptoms, and treatment options    Care Plan review completed: No    Medication Review:  Changes to psychiatric medications, see updated Medication List in EPIC.     Medication Compliance:  Yes    Changes in Health Issues:   Yes: Reflux disease, Associated Psychological Distress    Chemical Use Review:   Substance Use: Chemical use reviewed, no active concerns identified      Tobacco Use: No current tobacco use.      Assessment: Current Emotional / Mental Status (status of significant symptoms):  Risk status (Self / Other harm or suicidal ideation)  Patient denies a history of suicidal ideation, suicide attempts, self-injurious behavior, homicidal ideation, homicidal behavior and and other safety concerns  Patient denies current fears or concerns for personal safety.  Patient denies current or recent suicidal ideation or behaviors.  Patient denies current or recent homicidal ideation or behaviors.  Patient denies current or recent self injurious behavior or ideation.  Patient denies other safety concerns.  A safety and risk management plan has not been developed at this time, however patient was encouraged to call [place] [phone] should there be a change in any of these risk factors.    Appearance:   Appropriate   Eye Contact:   Good   Psychomotor Behavior: Normal   Attitude:   Cooperative  Interested  Orientation:   All  Speech   Rate / Production: Normal    Volume:  Normal   Mood:    Anxious   Affect:    Appropriate   Thought Content:  Clear   Thought  Form:  Coherent  Logical   Insight:    Good     Diagnoses:  1. Generalized anxiety disorder      Collateral Reports Completed:  Not Applicable    Plan: (Homework, other): Patient will continue using 4 count breathing and grounding skills within mindfulness.  Patient was given information about behavioral services and encouraged to schedule a follow up appointment with the clinic [facility] in 2 weeks.  He was also given information about mental health symptoms and treatment options .  CD Recommendations: No indications of CD issues. __________________________________________________________________    Integrated Primary Care Behavioral Health Treatment Plan    Patient's Name: Oniel Smart  YOB: 1990    Date: 11/29/2021    DSM-V Diagnoses: 300.02 (F41.1) Generalized Anxiety Disorder  Psychosocial / Contextual Factors: Patient was referred by his PCP due to an increase in anxiety related to his medical condition and it causes some impairment in his life.  WHODAS: 16    Referral / Collaboration:  Referral to another professional/service is not indicated at this time. Patient will be referred to long-term therapy if needed.    Anticipated number of session or this episode of care: 4-6 sessions    MeasurableTreatment Goal(s) related to diagnosis / functional impairment(s)  Goal 1: Patient will report a decrease in his anxiety related to eating around others.    I will know I've met my goal when I am less worried about having a burping attack.      Objective #A (Patient Action)    Patient will identify Rational and irrational fears / thoughts that contribute to feeling anxious.  Status: New - Date: 11/29/2021     Intervention(s)  [facility] will teach emotional recognition/identification. CBT.    Objective #B  Patient will use cognitive strategies identified in therapy to challenge anxious thoughts.  Status: New - Date: 11/29/2021     Intervention(s)  [facility] will teach emotional regulation skills. CBT,  mindfulness.    Patient has reviewed and agreed to the above plan.    Merlyn Tubbs, YASMINESW  December 27, 2021

## 2022-01-17 ENCOUNTER — VIRTUAL VISIT (OUTPATIENT)
Dept: BEHAVIORAL HEALTH | Facility: CLINIC | Age: 32
End: 2022-01-17
Payer: COMMERCIAL

## 2022-01-17 DIAGNOSIS — F41.1 GENERALIZED ANXIETY DISORDER: Primary | ICD-10-CM

## 2022-01-17 PROCEDURE — 90834 PSYTX W PT 45 MINUTES: CPT | Mod: GT | Performed by: SOCIAL WORKER

## 2022-01-18 NOTE — PROGRESS NOTES
Telemedicine Visit: The patient's condition can be safely assessed and treated via synchronous audio and visual telemedicine encounter.      Reason for Telemedicine Visit: Services only offered telehealth    Originating Site (Patient Location): Patient's home    Distant Site (Provider Location): [facility]: [place]    Consent:  The patient/guardian has verbally consented to: the potential risks and benefits of telemedicine (video visit) versus in person care; bill my insurance or make self-payment for services provided; and responsibility for payment of non-covered services.     Mode of Communication:  Video Conference via [employer]    As the provider I attest to compliance with applicable laws and regulations related to telemedicine.    [facility] [place] [facility]: Integrated Behavioral Health  January 17, 2022    Behavioral Health Clinician Progress Note    Patient Name: Oniel Smart         Service Type:  Individual      Service Location:   MyChart / Email (patient reached)     Session Start Time: 16:30  Session End Time: 17:13      Session Length: 38 - 52      Attendees: Patient    Visit Activities (Refresh list every visit): [facility] Only    Diagnostic Assessment Date: Pending  Treatment Plan Review Date: 03/01/2022  See Flowsheets for today's PHQ-9 and CINDI-7 results  Previous PHQ-9:   PHQ-9 SCORE 11/1/2021 11/28/2021 12/9/2021   PHQ-9 Total Score MyChart 13 (Moderate depression) 9 (Mild depression) 8 (Mild depression)   PHQ-9 Total Score 13 9 8     Previous CINDI-7:   CINDI-7 SCORE 11/1/2021 11/28/2021 12/9/2021   Total Score 15 (severe anxiety) 10 (moderate anxiety) 10 (moderate anxiety)   Total Score 15 10 10       YUMI LEVEL:  YUMI Score (Last Two) 12/14/2020   YUMI Raw Score 36   Activation Score 75.5   YUMI Level 4       DATA  Extended Session (60+ minutes): No  Interactive Complexity: No  Crisis: No  [facility] Patient: No    Treatment Objective(s) Addressed in This Session:  Target  Behavior(s): disease management/lifestyle changes related to anxiety    Anxiety: will develop more effective coping skills to manage anxiety symptoms and will develop healthy cognitive patterns and beliefs    Current Stressors / Issues:  Patient reports he continues to experience anxiety related to eating in public and his medical condition but is working on acceptance and feels this has decreased somewhat.  Patient recognizes he is still avoiding some social contact and attributes this to fears and embarrassment related to having an episode.  Patient reports he has been actively using his skills discussed in therapy and is finding some stability.  [facility] praised patient for his progress and insight.  [facility] processed patient continuing to work towards acceptance and recognizing that he does not need the approval noted focus on perceived judgment of others.  [facility] processed competence and patient working on finding his own value.  [facility] encouraged patient to use experiences from his past and do reflecting journaling on how that is building his character and personality now.  [facility] also discussed the value in a gratitude journal if needed.  [facility] encouraged patient to continue working on being able to eat in public and socializing as he feels comfortable.    Progress on Treatment Objective(s) / Homework:  Satisfactory progress - ACTION (Actively working towards change); Intervened by reinforcing change plan / affirming steps taken    Situation    anxious thoughts and burping episodes     Automatic Thoughts  Cognitive Distortions    others will notice, they will make judgments   Feelings    embarrassed, anxious, fearful, panicked     Behavior    avoiding eating in public, limiting intake around others     Questioning Thoughts   What can I control right now? Notice in the moment         Also provided psychoeducation about behavioral health condition, symptoms, and treatment options    Care Plan review completed: No    Medication  Review:  No changes to current psychiatric medication(s)    Medication Compliance:  Yes    Changes in Health Issues:   Yes: Reflux disease, Associated Psychological Distress    Chemical Use Review:   Substance Use: Chemical use reviewed, no active concerns identified      Tobacco Use: No current tobacco use.      Assessment: Current Emotional / Mental Status (status of significant symptoms):  Risk status (Self / Other harm or suicidal ideation)  Patient denies a history of suicidal ideation, suicide attempts, self-injurious behavior, homicidal ideation, homicidal behavior and and other safety concerns  Patient denies current fears or concerns for personal safety.  Patient denies current or recent suicidal ideation or behaviors.  Patient denies current or recent homicidal ideation or behaviors.  Patient denies current or recent self injurious behavior or ideation.  Patient denies other safety concerns.  A safety and risk management plan has not been developed at this time, however patient was encouraged to call [place] [phone] should there be a change in any of these risk factors.    Appearance:   Appropriate   Eye Contact:   Good   Psychomotor Behavior: Normal   Attitude:   Cooperative  Interested  Orientation:   All  Speech   Rate / Production: Normal    Volume:  Normal   Mood:    Anxious   Affect:    Appropriate   Thought Content:  Clear   Thought Form:  Coherent  Logical   Insight:    Good     Diagnoses:  1. Generalized anxiety disorder      Collateral Reports Completed:  Not Applicable    Plan: (Homework, other): Patient will use reflective journaling to focus on his positive attributes and value.  Patient was given information about behavioral services and encouraged to schedule a follow up appointment with the clinic [facility] in 3 weeks.  He was also given information about mental health symptoms and treatment options .  Patient has opted to be referred to long-term therapy for continued support CD  Recommendations: No indications of CD issues. __________________________________________________________________    Integrated Primary Care Behavioral Health Treatment Plan    Patient's Name: Oniel Smart  YOB: 1990    Date: 11/29/2021    DSM-V Diagnoses: 300.02 (F41.1) Generalized Anxiety Disorder  Psychosocial / Contextual Factors: Patient was referred by his PCP due to an increase in anxiety related to his medical condition and it causes some impairment in his life.  WHODAS: 16    Referral / Collaboration:  The following referral(s) will be initiated: Patient will be referred for long-term therapy     Anticipated number of session or this episode of care: 4-6 sessions    MeasurableTreatment Goal(s) related to diagnosis / functional impairment(s)  Goal 1: Patient will report a decrease in his anxiety related to eating around others.    I will know I've met my goal when I am less worried about having a burping attack.      Objective #A (Patient Action)    Patient will identify Rational and irrational fears / thoughts that contribute to feeling anxious.  Status: New - Date: 11/29/2021     Intervention(s)  [facility] will teach emotional recognition/identification. CBT.    Objective #B  Patient will use cognitive strategies identified in therapy to challenge anxious thoughts.  Status: New - Date: 11/29/2021     Intervention(s)  [facility] will teach emotional regulation skills. CBT, mindfulness.    Patient has reviewed and agreed to the above plan.    MEHRDAD Mena  January 17, 2022

## 2022-01-30 ENCOUNTER — HEALTH MAINTENANCE LETTER (OUTPATIENT)
Age: 32
End: 2022-01-30

## 2022-02-03 ASSESSMENT — ANXIETY QUESTIONNAIRES
7. FEELING AFRAID AS IF SOMETHING AWFUL MIGHT HAPPEN: NOT AT ALL
GAD7 TOTAL SCORE: 6
1. FEELING NERVOUS, ANXIOUS, OR ON EDGE: MORE THAN HALF THE DAYS
2. NOT BEING ABLE TO STOP OR CONTROL WORRYING: NOT AT ALL
5. BEING SO RESTLESS THAT IT IS HARD TO SIT STILL: NOT AT ALL
GAD7 TOTAL SCORE: 6
GAD7 TOTAL SCORE: 6
4. TROUBLE RELAXING: MORE THAN HALF THE DAYS
3. WORRYING TOO MUCH ABOUT DIFFERENT THINGS: SEVERAL DAYS
6. BECOMING EASILY ANNOYED OR IRRITABLE: SEVERAL DAYS
7. FEELING AFRAID AS IF SOMETHING AWFUL MIGHT HAPPEN: NOT AT ALL

## 2022-02-04 ENCOUNTER — VIRTUAL VISIT (OUTPATIENT)
Dept: PEDIATRICS | Facility: CLINIC | Age: 32
End: 2022-02-04
Payer: COMMERCIAL

## 2022-02-04 DIAGNOSIS — F41.9 ANXIETY: ICD-10-CM

## 2022-02-04 PROCEDURE — 99213 OFFICE O/P EST LOW 20 MIN: CPT | Mod: GT | Performed by: INTERNAL MEDICINE

## 2022-02-04 RX ORDER — BUSPIRONE HYDROCHLORIDE 15 MG/1
TABLET ORAL
Qty: 190 TABLET | Refills: 1 | Status: SHIPPED | OUTPATIENT
Start: 2022-02-04 | End: 2022-08-01

## 2022-02-04 RX ORDER — SERTRALINE HYDROCHLORIDE 100 MG/1
100 TABLET, FILM COATED ORAL DAILY
Qty: 90 TABLET | Refills: 1 | Status: SHIPPED | OUTPATIENT
Start: 2022-02-04 | End: 2022-08-01

## 2022-02-04 ASSESSMENT — ANXIETY QUESTIONNAIRES: GAD7 TOTAL SCORE: 6

## 2022-02-04 NOTE — PROGRESS NOTES
Oniel is a 31 year old who is being evaluated via a billable video visit.      How would you like to obtain your AVS? MyChart  If the video visit is dropped, the invitation should be resent by:   Will anyone else be joining your video visit? No    Video Start Time: 8:43 AM    Assessment & Plan     Anxiety  Improved on current dose (increased 8 weeks ago).  Doing well in therapy with [facility].  I believe he would be a good candidate for long-term therapy.  His somatic GI symptoms are also improved.    - sertraline (ZOLOFT) 100 MG tablet; Take 1 tablet (100 mg) by mouth daily  - busPIRone (BUSPAR) 15 MG tablet; Take 15 mg (1 tab) twice daily.  Can take an additional 15 mg daily as needed for breakthrough anxiety.         F/up in 6 months for preventive and mental health f/up    Return in about 6 months (around 8/4/2022) for Preventive Visit and f/up mental health.    Julia Delgado MD  [facility] is a 31 year old who presents for the following health issues     History of Present Illness       Mental Health Follow-up:  Patient presents to follow-up on Anxiety.    Patient's anxiety since last visit has been:  Better  The patient is having other symptoms associated with anxiety.  Any significant life events: financial concerns and health concerns  Patient is feeling anxious or having panic attacks.  Patient has no concerns about alcohol or drug use.     Social History  Tobacco Use    Smoking status: Never Smoker    Smokeless tobacco: Never Used  Alcohol use: Yes    Comment: socially  Drug use: Never      Today's PHQ-9         PHQ-9 Total Score:         PHQ-9 Q9 Thoughts of better off dead/self-harm past 2 weeks :       Thoughts of suicide or self harm:      Self-harm Plan:        Self-harm Action:          Safety concerns for self or others:           He eats 2-3 servings of fruits and vegetables daily.He consumes 0 sweetened beverage(s) daily.He exercises with enough effort to  increase his heart rate 30 to 60 minutes per day.  He exercises with enough effort to increase his heart rate 5 days per week. He is missing 1 dose(s) of medications per week.  He is not taking prescribed medications regularly due to other.       Therapy very helpful.  Developing healthy techniques.  Zoloft 100 mg is a good dose - much better.  Buspar 15 mg BID - has not taken any additional dose.    Still having anxiety attacks - more manageable.  Long term plan with [facility] will be discussed.      CINDI-7 SCORE 11/28/2021 12/9/2021 2/3/2022   Total Score 10 (moderate anxiety) 10 (moderate anxiety) 6 (mild anxiety)   Total Score 10 10 6     PHQ 11/1/2021 11/28/2021 12/9/2021   PHQ-9 Total Score 13 9 8   Q9: Thoughts of better off dead/self-harm past 2 weeks Not at all Not at all Not at all     Answers for HPI/ROS submitted by the patient on 2/3/2022  CINDI 7 TOTAL SCORE: 6              Review of Systems   All other systems on a 10-point review are negative, unless otherwise noted in HPI        Objective           Vitals:  No vitals were obtained today due to virtual visit.    Physical Exam   GENERAL: Healthy, alert and no distress  EYES: Eyes grossly normal to inspection.  No discharge or erythema, or obvious scleral/conjunctival abnormalities.  RESP: No audible wheeze, cough, or visible cyanosis.  No visible retractions or increased work of breathing.    SKIN: Visible skin clear. No significant rash, abnormal pigmentation or lesions.  NEURO: Cranial nerves grossly intact.  Mentation and speech appropriate for age.  PSYCH: Mentation appears normal, affect normal/bright, judgement and insight intact, normal speech and appearance well-groomed.                 Video-Visit Details    Type of service:  Video Visit    Video End Time:8:51 AM    Originating Location (pt. Location): Home    Distant Location (provider location):  [facility] [place]     Platform used for Video Visit: Compressus

## 2022-02-10 ENCOUNTER — VIRTUAL VISIT (OUTPATIENT)
Dept: BEHAVIORAL HEALTH | Facility: CLINIC | Age: 32
End: 2022-02-10
Payer: COMMERCIAL

## 2022-02-10 DIAGNOSIS — F41.1 GENERALIZED ANXIETY DISORDER: Primary | ICD-10-CM

## 2022-02-10 PROCEDURE — 90791 PSYCH DIAGNOSTIC EVALUATION: CPT | Mod: GT | Performed by: SOCIAL WORKER

## 2022-02-10 NOTE — PROGRESS NOTES
"    [facility] [place]: Integrated Behavioral Health  Provider Name: Merlyn Tubbs    Credentials: [facility]    PATIENT'S NAME: Oniel Smart  PREFERRED NAME: Oniel  PRONOUNS: He/his/him  MRN: [id number]  : 1990  ADDRESS: [address]  [place] [address]  ACCT. NUMBER:  [id number]  DATE OF SERVICE: 2/10/22  START TIME: 08:31 am  END TIME: 09:30 (information gathered over several sessions)  PREFERRED PHONE: [phone]  May we leave a program related message: Yes  SERVICE MODALITY:  Video Visit:      Provider verified identity through the following two step process.  Patient provided:  Patient is known previously to provider    Telemedicine Visit: The patient's condition can be safely assessed and treated via synchronous audio and visual telemedicine encounter.      Reason for Telemedicine Visit: Services only offered telehealth    Originating Site (Patient Location): Patient's home    Distant Site (Provider Location): [facility] [place]    Consent:  The patient/guardian has verbally consented to: the potential risks and benefits of telemedicine (video visit) versus in person care; bill my insurance or make self-payment for services provided; and responsibility for payment of non-covered services.     Patient would like the video invitation sent by:  My Chart    Mode of Communication:  Video Conference via [employer]    As the provider I attest to compliance with applicable laws and regulations related to telemedicine.    UNIVERSAL ADULT Mental Health DIAGNOSTIC ASSESSMENT    Identifying Information:  Patient is a 31 year old, .  The pronoun use throughout this assessment reflects the patient's chosen pronoun.  Patient was referred for an assessment by primary care clinic.  Patient attended the session alone.    Chief Complaint:   The reason for seeking services at this time is: \"Coping skills for anxiety\".  The problem(s) began 21.  Patient " "reports he has noticed an increase in his anxiety due to a medical condition involving an esophageal reflux/spasm.  Patient recognizes he does have a prior history of anxiety and depression starting as a teenager, and the medical situation is exacerbating it.  Patient describes that he will sometimes feel bloated and get burping attacks which are hard for him to control or stop.  This causes him embarrassment when he is out in public or around other people, and he has been avoiding social eating as a result.  Patient also describes ruminating and intrusive thoughts and becoming stuck in cyclical thinking.  Patient feels this has an impact on him being able to efficiently complete tasks at both home and work as he is easily distracted and can get off topic.  Patient becomes restless and feels that his behaviors are obsessive and fixated.  Patient reports ongoing stomach upset.    Patient has attempted to resolve these concerns in the past through being careful with diet choices, talking with GI specialist and running to relieve anxiety.    Social/Family History:  Patient reported they grew up in [place].  They were raised by biological mother.  Parents were never  and the relationship ended when patient was born. Patient has one older brother (8 years older) and is the youngest child.  Patient reported that their childhood was \"good.\"  Patient feels his mother and extended family were always suportive and loving, and he had a positive friend group.  He did feel different than some of his friends because [place] were two-parent households.  Patient feels he was more mature and had to work harder than his peers because he learned independence at a young age.  Patient also recognizes this has been positive for him as an adult with his education and career.  Patient reports he never saw his father and they never had a relationship.  Patient used to " question this but it has since become normal for him.  Patient described their current relationships with family of origin as: good.  Patient feels both his mother and brother are more career focused and he is more relationship based, which can cause some separation.    The patient describes their cultural background as .  Patient feels that growing up in a lower income and single [place] did [place] him from his friends.  Cultural influences and impact on patient's life structure, values, norms, and healthcare: N/A.  Contextual influences on patient's health include: none identified.    Patient identified their preferred language to be English. Patient reported they do not need the assistance of an  or other support involved in therapy.     Patient reported had no significant delays in developmental tasks.  Patient reports he developed tics as a young child and they delayed him starting  by a year.  These tics resolved by the second or third grade on their own and he has not had them since.  Patient's highest education level was graduate school.  Patient identified the following learning problems: none reported.  Modifications will not be used to assist communication in therapy. Patient reports they are  able to understand written materials.    Patient reported the following relationship history: started dating in high school.  Patient's current relationship status is  for over 2 years and together 8 years.   Patient identified their sexual orientation as heterosexual.  Patient reported having no child(sana). Patient identified partner; mother; siblings as part of their support system.  Patient identified the quality of these relationships as stable and meaningful.      Patient's current living/housing situation involves staying in own home/apartment.  The immediate members of family and household include Sachi Smart, 29,Spouse and they report that  housing is stable.    Patient is currently employed fulltime.  Patient reports their finances are obtained through employment; spouse. Patient does identify finances as a current stressor.      Patient reported that they have not been involved with the legal system. Patient does not report being under probation/ parole/ jurisdiction.    Patient's Strengths and Limitations:  Patient identified the following strengths or resources that will help them succeed in treatment: commitment to health and well being, exercise routine, friends / good social support, family support, insight, intelligence, positive work environment, motivation and work ethic. Things that may interfere with the patient's success in treatment include: none identified.     Assessments:  The following assessments were completed by patient for this visit:  PHQ9:   PHQ-9 SCORE 12/14/2020 2/1/2021 11/1/2021 11/28/2021 12/9/2021   PHQ-9 Total Score MyChart 11 (Moderate depression) 7 (Mild depression) 13 (Moderate depression) 9 (Mild depression) 8 (Mild depression)   PHQ-9 Total Score 11 7 13 9 8     GAD7:   CINDI-7 SCORE 12/14/2020 2/1/2021 11/1/2021 11/28/2021 12/9/2021 2/3/2022   Total Score 11 (moderate anxiety) 8 (mild anxiety) 15 (severe anxiety) 10 (moderate anxiety) 10 (moderate anxiety) 6 (mild anxiety)   Total Score 11 8 15 10 10 6     CAGE-AID:   CAGE-AID Total Score 11/28/2021   Total Score 0   Total Score MyChart 0 (A total score of 2 or greater is considered clinically significant)     [place] Suicide Severity Rating Scale (Lifetime/Recent)  [place] Suicide Severity Rating (Lifetime/Recent) 11/29/2021   1. Wish to be Dead (Lifetime) No   1. Wish to be Dead (Recent) No   2. Non-Specific Active Suicidal Thoughts (Lifetime) No   2. Non-Specific Active Suicidal Thoughts (Recent) No   3. Active Suicidal Ideation with any Methods (Not Plan) Without Intent to Act (Lifetime) No   3. Active Suicidal Ideation with any Methods (Not Plan) Without Intent  to Act (Recent) No   4. Active Suicidal Ideation with Some Intent to Act, Without Specific Plan (Lifetime) No   4. Active Suicidal Ideation with Some Intent to Act, Without Specific Plan (Recent) No   5. Active Suicidal Ideation with Specific Plan and Intent (Lifetime) No   5. Active Suicidal Ideation with Specific Plan and Intent (Recent) No   Actual Attempt (Lifetime) No   Actual Attempt (Past 3 Months) No   Has subject engaged in non-suicidal self-injurious behavior? (Lifetime) No   Has subject engaged in non-suicidal self-injurious behavior? (Past 3 Months) No   Interrupted Attempts (Lifetime) No   Interrupted Attempts (Past 3 Months) No   Aborted or Self-Interrupted Attempt (Lifetime) No   Aborted or Self-Interrupted Attempt (Past 3 Months) No   Preparatory Acts or Behavior (Lifetime) No   Preparatory Acts or Behavior (Past 3 Months) No       Personal and Family Medical History:  Patient does not report a family history of mental health concerns.  Patient reports family history includes Colon Cancer in his father; Prostate Cancer in his maternal grandfather; Thyroid Disease in his mother.     Patient does not report Mental Health Diagnosis or Treatment. He did see his PCP for medication in high school.  No prior history of therapy.    Patient has had a physical exam to rule out medical causes for current symptoms.  Date of last physical exam was within the past year. Client was encouraged to follow up with PCP if symptoms were to develop. The patient has a [place] Primary Care Provider, who is named Ricky Delgado MD.  Patient reports no current medical and/or dental concerns.  Patient denies any issues with pain.   There are not significant appetite / nutritional concerns / weight changes.   Patient does not report a history of head injury / trauma / cognitive impairment.    Patient reports current meds as:   No outpatient medications have been marked as taking for the 2/10/22 encounter (Appointment) with  Merlyn Tubbs, [facility].   See EMR for medications.    Medication Adherence:  Patient reports taking.  taking prescribed medications as prescribed.    Patient Allergies:    Allergies   Allergen Reactions     Bactrim [Sulfamethoxazole W/Trimethoprim]      Medical History:    Past Medical History:   Diagnosis Date     Depressive disorder      Current Mental Status Exam:   Appearance:  Appropriate    Eye Contact:  Good   Psychomotor:  Normal       Gait / station:  no problem  Attitude / Demeanor: Cooperative  Interested Pleasant  Speech      Rate / Production: Normal/ Responsive      Volume:  Normal  volume      Language:  intact  Mood:   Anxious   Affect:   Appropriate    Thought Content: Clear   Thought Process: Coherent  Logical       Associations: No loosening of associations  Insight:   Good   Judgment:  Intact   Orientation:  All  Attention/concentration: Good      Substance Use:  Patient did not report a family history of substance use concerns.  Patient has not received chemical dependency treatment in the past.  Patient has not ever been to detox.      Patient is not currently receiving any chemical dependency treatment.       Substance History of use Age of first use Date of last use     Pattern and duration of use (include amounts and frequency)   Alcohol currently use   21 11/27/21 REPORTS SUBSTANCE USE: reports using substance 1 times per week and has 2 drinks at a time.   Patient reports heaviest use was never.   Cannabis   never used     REPORTS SUBSTANCE USE: N/A     Amphetamines   never used     REPORTS SUBSTANCE USE: N/A   Cocaine/crack    never used       REPORTS SUBSTANCE USE: N/A   Hallucinogens never used         REPORTS SUBSTANCE USE: N/A   Inhalants never used         REPORTS SUBSTANCE USE: N/A   Heroin never used         REPORTS SUBSTANCE USE: N/A   Other Opiates never used     REPORTS SUBSTANCE USE: N/A   Benzodiazepine   never used     REPORTS SUBSTANCE USE: N/A   Barbiturates never used      REPORTS SUBSTANCE USE: N/A   Over the counter meds never used     REPORTS SUBSTANCE USE: N/A   Caffeine currently use 24   REPORTS SUBSTANCE USE: reports using substance 1 times per day and has 2 coffees at a time.   Patient reports heaviest use was never.   Nicotine  never used     REPORTS SUBSTANCE USE: N/A   Other substances not listed above:  Identify:  never used     REPORTS SUBSTANCE USE: N/A     Patient reported the following problems as a result of their substance use: no problems, not applicable.    Substance Use: No symptoms    Based on the negative CAGE score and clinical interview there  are not indications of drug or alcohol abuse.      Significant Losses / Trauma / Abuse / Neglect Issues:   Patient did not serve in the .  There are indications or report of significant loss, trauma, abuse or neglect issues related to: are no indications and client denies any losses, trauma, abuse, or neglect concerns.  Concerns for possible neglect are not present.    Safety Assessment:   Patient denies current homicidal ideation and behaviors.  Patient denies current self-injurious ideation and behaviors.    Patient denied risk behaviors associated with substance use.  Patient denies any high risk behaviors associated with mental health symptoms.  Patient reports the following current concerns for their personal safety: None.  Patient reports there are not firearms in the house.    History of Safety Concerns:  Patient denied a history of homicidal ideation.     Patient denied a history of personal safety concerns.    Patient denied a history of assaultive behaviors.    Patient denied a history of sexual assault behaviors.     Patient denied a history of risk behaviors associated with substance use.  Patient denies any history of high risk behaviors associated with mental health symptoms.  Patient reports the following protective factors: forward or future oriented thinking; dedication to family or friends; safe  and stable environment; regular sleep; effectively controls impulses; regular physical activity; sense of belonging; purpose; secure attachment; help seeking behaviors when distressed; abstinence from substances; adherence with prescribed medication; living with other people; daily obligations; structured day; effective problem solving skills; commitment to well being; positive social skills; healthy fear of risky behaviors or pain; financial stability    Risk Plan:  See Recommendations for Safety and Risk Management Plan    Review of Symptoms per patient report:  Depression: Lack of interest, Difficulties concentrating, Change in appetite, Low self-worth, Ruminations, Irritability and Feeling sad, down, or depressed  Nani:  No Symptoms  Psychosis: No Symptoms  Anxiety: Excessive worry, Nervousness, Physical complaints, such as headaches, stomachaches, muscle tension, Social anxiety, Ruminations and Poor concentration  Panic:  Palpitations, Tremors, Shortness of breath and Triggers social eating  Post Traumatic Stress Disorder:  No Symptoms   Eating Disorder: No Symptoms  ADD / ADHD:  No symptoms  Conduct Disorder: No symptoms  Autism Spectrum Disorder: No symptoms  Obsessive Compulsive Disorder: No Symptoms    Patient reports the following compulsive behaviors and treatment history: None.      Diagnostic Criteria:   Generalized Anxiety Disorder  A. Excessive anxiety and worry about a number of events or activities (such as work or school performance).   B. The person finds it difficult to control the worry.  C. Select 3 or more symptoms (required for diagnosis). Only one item is required in children.   - Restlessness or feeling keyed up or on edge.    - Being easily fatigued.    - Difficulty concentrating or mind going blank.    - Irritability.    - Muscle tension.   D. The focus of the anxiety and worry is not confined to features of an Axis I disorder.  E. The anxiety, worry, or physical symptoms cause  clinically significant distress or impairment in social, occupational, or other important areas of functioning.   F. The disturbance is not due to the direct physiological effects of a substance (e.g., a drug of abuse, a medication) or a general medical condition (e.g., hyperthyroidism) and does not occur exclusively during a Mood Disorder, a Psychotic Disorder, or a Pervasive Developmental Disorder.    - The aformentioned symptoms began several year(s) ago and occurs 7 days per week and is experienced as moderate.    Functional Status:  Patient reports the following functional impairments:  relationship(s), self-care, social interactions and work / vocational responsibilities.     Nonprogrammatic care:  Patient is requesting basic services to address current mental health concerns.    Clinical Summary:  1. Reason for assessment: Patient was referred by his PCP due to an increase in acute anxiety related to a medical condition.  Patient does have a prior history of anxiety.  2. Psychosocial, Cultural and Contextual Factors: Patient lives with his spouse and currently works full-time.  Patient does have social supports.  3. Principal DSM5 Diagnoses  (Sustained by DSM5 Criteria Listed Above):   300.02 (F41.1) Generalized Anxiety Disorder.  4. Other Diagnoses that is relevant to services:   296.35 (F33.41)  Major Depressive Disorder, Recurrent Episode, In partial remission _Per patient report.  5. Provisional Diagnosis: Not applicable.  6. Prognosis: Relieve Acute Symptoms.  7. Likely consequences of symptoms if not treated: Patient will continue to experience negative symptoms.  8. Client strengths include:  caring, educated, empathetic, employed, goal-focused, good listener, insightful, intelligent, motivated, open to learning, open to suggestions / feedback, support of family, friends and providers, wants to learn, willing to ask questions, willing to relate to others and work history .     Recommendations:     1.  Plan for Safety and Risk Management:   Recommended that patient call 911 or go to the local ED should there be a change in any of these risk factors..          Report to child / adult protection services was NA.     2. Patient's identified physical health concerns with a cultural influence will be addressed by Nothing identified at this time.     3. Initial Treatment will focus on:    Anxiety - Patient will report improvement in his acute anxiety, especially as it relates to social eating.     4. Resources/Service Plan:    services are not indicated.   Modifications to assist communication are not indicated.   Additional disability accommodations are not indicated.      5. Collaboration:   Collaboration / coordination of treatment will be initiated with the following  support professionals: primary care physician.      6.  Referrals:   The following referral(s) will be initiated: Outpatient [facility]. Next Scheduled Appointment: As needed with this [facility].     A Release of Information has been obtained for the following: Not applicable.    7. CORBIN: Not applicable    8. Records:   These were reviewed at time of assessment.   Information in this assessment was obtained from the medical record and  provided by patient who is a good historian.    Patient will have open access to their mental health medical record.        Provider Name/ Credentials:  Merlyn Tubbs, [facility]  February 10, 2022

## 2022-03-04 ENCOUNTER — VIRTUAL VISIT (OUTPATIENT)
Dept: BEHAVIORAL HEALTH | Facility: CLINIC | Age: 32
End: 2022-03-04
Payer: COMMERCIAL

## 2022-03-04 DIAGNOSIS — F41.1 GENERALIZED ANXIETY DISORDER: Primary | ICD-10-CM

## 2022-03-04 PROCEDURE — 90832 PSYTX W PT 30 MINUTES: CPT | Mod: GT | Performed by: SOCIAL WORKER

## 2022-03-04 NOTE — PROGRESS NOTES
Telemedicine Visit: The patient's condition can be safely assessed and treated via synchronous audio and visual telemedicine encounter.      Reason for Telemedicine Visit: Services only offered telehealth    Originating Site (Patient Location): Patient's home    Distant Site (Provider Location): [facility]: [place]    Consent:  The patient/guardian has verbally consented to: the potential risks and benefits of telemedicine (video visit) versus in person care; bill my insurance or make self-payment for services provided; and responsibility for payment of non-covered services.     Mode of Communication:  Video Conference via [employer]    As the provider I attest to compliance with applicable laws and regulations related to telemedicine.    [facility] [place] Primary Care: Integrated Behavioral Health  March 4, 2022    Behavioral Health Clinician Progress Note    Patient Name: Oniel Smart         Service Type:  Individual      Service Location:   MyChart / Email (patient reached)     Session Start Time: 08:30 am  Session End Time: 08:56      Session Length: 16 - 37      Attendees: Patient    Visit Activities (Refresh list every visit): [facility] Only    Diagnostic Assessment Date: 02/10/2022  Treatment Plan Review Date: 03/04/2022, due by 06/04/2022  See Flowsheets for today's PHQ-9 and CINDI-7 results  Previous PHQ-9:   PHQ-9 SCORE 11/1/2021 11/28/2021 12/9/2021   PHQ-9 Total Score MyChart 13 (Moderate depression) 9 (Mild depression) 8 (Mild depression)   PHQ-9 Total Score 13 9 8     Previous CINDI-7:   CINDI-7 SCORE 11/28/2021 12/9/2021 2/3/2022   Total Score 10 (moderate anxiety) 10 (moderate anxiety) 6 (mild anxiety)   Total Score 10 10 6       YUMI LEVEL:  YUMI Score (Last Two) 12/14/2020   YUMI Raw Score 36   Activation Score 75.5   YUMI Level 4       DATA  Extended Session (60+ minutes): No  Interactive Complexity: No  Crisis: No  [facility] Patient: No    Treatment Objective(s) Addressed in This  "Session:  Target Behavior(s): disease management/lifestyle changes related to anxiety    Anxiety: will develop more effective coping skills to manage anxiety symptoms and will develop healthy cognitive patterns and beliefs    Current Stressors / Issues:  Patient presents with an improved affect and reports he is feeling improvement in his anxiety and physical symptoms during the day.  Patient has been actively using his breathing and grounding skills, and finding some relief.  Patient is now noticing a lot of acid reflux and anxiety symptoms at night, creating distress and sometimes impacting his sleep.  Patient then feels tired for several hours in the morning and describes a feeling of \"dread.\"  [facility] praised patient for his progress and insight.  [facility] reviewed patient using breathing and grounding skills, and how to start incorporating this into his nighttime routine.  [facility] assisted patient in identifying lifestyle changes, such as eating smaller portions at night, going for a walk after eating, and focusing on mindful eating to see if that assists with his evening discomfort and bloating.  [facility] also educated about using the mindful practice of guided meditation and patient using medications that focus on releasing, letting go and things flowing away as he prepares for bed to see if this assists with decreasing some of his distress.  [facility] discussed a transition plan as she is resigning from this position.  Patient indicated he is interested in getting connected with a health psychologist and will be sent resources through [employer].  Patient was also encouraged to reach out to the clinic for further mental health support if needed.    Progress on Treatment Objective(s) / Homework:  Satisfactory progress - ACTION (Actively working towards change); Intervened by reinforcing change plan / affirming steps taken    Situation    anxious thoughts and burping episodes     Automatic Thoughts  Cognitive Distortions    others will " notice, they will make judgments   Feelings    embarrassed, anxious, fearful, panicked     Behavior    avoiding eating in public, limiting intake around others     Questioning Thoughts   What can I control right now? Notice in the moment         Also provided psychoeducation about behavioral health condition, symptoms, and treatment options    Care Plan review completed: Yes    Medication Review:  No changes to current psychiatric medication(s)    Medication Compliance:  Yes    Changes in Health Issues:   Yes: Reflux disease, Associated Psychological Distress    Chemical Use Review:   Substance Use: Chemical use reviewed, no active concerns identified      Tobacco Use: No current tobacco use.      Assessment: Current Emotional / Mental Status (status of significant symptoms):  Risk status (Self / Other harm or suicidal ideation)  Patient denies a history of suicidal ideation, suicide attempts, self-injurious behavior, homicidal ideation, homicidal behavior and and other safety concerns  Patient denies current fears or concerns for personal safety.  Patient denies current or recent suicidal ideation or behaviors.  Patient denies current or recent homicidal ideation or behaviors.  Patient denies current or recent self injurious behavior or ideation.  Patient denies other safety concerns.  A safety and risk management plan has not been developed at this time, however patient was encouraged to call [facility] should there be a change in any of these risk factors.    Appearance:   Appropriate   Eye Contact:   Good   Psychomotor Behavior: Normal   Attitude:   Cooperative  Interested  Orientation:   All  Speech   Rate / Production: Normal    Volume:  Normal   Mood:    Anxious   Affect:    Appropriate   Thought Content:  Clear   Thought Form:  Coherent  Logical   Insight:    Good     Diagnoses:  1. Generalized anxiety disorder      Collateral Reports Completed:  Not Applicable    Plan: (Homework, other): Patient  will use breathing, grounding and guided meditation at night.  Patient was given information about behavioral services and encouraged to schedule a follow up appointment with the clinic [facility] as needed.  He was also given information about mental health symptoms and treatment options .  Patient will follow-up on scheduling with a health psychologist and can access the clinic for further mental health needs.  CD Recommendations: No indications of CD issues. __________________________________________________________________                                            Individual Treatment Plan    Patient's Name: Oniel Smart  YOB: 1990    Date of Creation: 11/29/2021  Date Treatment Plan Last Reviewed/Revised: 03/04/2022    DSM5 Diagnoses: 300.02 (F41.1) Generalized Anxiety Disorder  Psychosocial / Contextual Factors: Patient was referred by his PCP due to an increase in anxiety related to his medical condition and it causes some impairment in his life.  PROMIS (reviewed every 90 days): Not completed    Referral / Collaboration:  Was/were discussed and patient will pursue. Patient will follow through with scheduling with a health psychologist.    Anticipated number of session for this episode of care: Return to care as needed  Anticipation frequency of session: Every other week  Anticipated Duration of each session: 16-37 minutes  Treatment plan will be reviewed in 90 days or when goals have been changed.       MeasurableTreatment Goal(s) related to diagnosis / functional impairment(s)  Goal 1: Patient will report a decrease in his anxiety related to eating around others.    I will know I've met my goal when I am less worried about having a burping attack..      Objective #A (Patient Action)    Patient will identify rational and irrational fears / thoughts that contribute to feeling anxious.  Status: Continued - Date(s):03/04/2022     Intervention(s)  Therapist will teach emotional  recognition/identification. CBT for anxiety.    Objective #B  Patient will use cognitive strategies identified in therapy to challenge anxious thoughts.  Status: Continued - Date(s):03/04/2022     Intervention(s)  Therapist will teach emotional regulation skills. CBT and mindfulness.    Patient has reviewed and agreed to the above plan.      MEHRDAD Mena  March 4, 2022

## 2022-07-29 SDOH — ECONOMIC STABILITY: FOOD INSECURITY: WITHIN THE PAST 12 MONTHS, YOU WORRIED THAT YOUR FOOD WOULD RUN OUT BEFORE YOU GOT MONEY TO BUY MORE.: NEVER TRUE

## 2022-07-29 SDOH — ECONOMIC STABILITY: TRANSPORTATION INSECURITY
IN THE PAST 12 MONTHS, HAS LACK OF TRANSPORTATION KEPT YOU FROM MEETINGS, WORK, OR FROM GETTING THINGS NEEDED FOR DAILY LIVING?: NO

## 2022-07-29 SDOH — ECONOMIC STABILITY: INCOME INSECURITY: IN THE LAST 12 MONTHS, WAS THERE A TIME WHEN YOU WERE NOT ABLE TO PAY THE MORTGAGE OR RENT ON TIME?: NO

## 2022-07-29 SDOH — ECONOMIC STABILITY: INCOME INSECURITY: HOW HARD IS IT FOR YOU TO PAY FOR THE VERY BASICS LIKE FOOD, HOUSING, MEDICAL CARE, AND HEATING?: NOT HARD AT ALL

## 2022-07-29 SDOH — HEALTH STABILITY: PHYSICAL HEALTH: ON AVERAGE, HOW MANY DAYS PER WEEK DO YOU ENGAGE IN MODERATE TO STRENUOUS EXERCISE (LIKE A BRISK WALK)?: 6 DAYS

## 2022-07-29 SDOH — ECONOMIC STABILITY: FOOD INSECURITY: WITHIN THE PAST 12 MONTHS, THE FOOD YOU BOUGHT JUST DIDN'T LAST AND YOU DIDN'T HAVE MONEY TO GET MORE.: NEVER TRUE

## 2022-07-29 SDOH — ECONOMIC STABILITY: TRANSPORTATION INSECURITY
IN THE PAST 12 MONTHS, HAS THE LACK OF TRANSPORTATION KEPT YOU FROM MEDICAL APPOINTMENTS OR FROM GETTING MEDICATIONS?: NO

## 2022-07-29 SDOH — HEALTH STABILITY: PHYSICAL HEALTH: ON AVERAGE, HOW MANY MINUTES DO YOU ENGAGE IN EXERCISE AT THIS LEVEL?: 30 MIN

## 2022-07-29 ASSESSMENT — ENCOUNTER SYMPTOMS
SORE THROAT: 0
ABDOMINAL PAIN: 0
FREQUENCY: 0
CONSTIPATION: 0
WEAKNESS: 0
PALPITATIONS: 0
CHILLS: 0
HEMATOCHEZIA: 0
SHORTNESS OF BREATH: 0
HEADACHES: 0
DYSURIA: 0
EYE PAIN: 0
DIARRHEA: 0
FEVER: 0
MYALGIAS: 0
PARESTHESIAS: 0
ARTHRALGIAS: 0
COUGH: 0
JOINT SWELLING: 0
NAUSEA: 0
DIZZINESS: 0
HEMATURIA: 0
NERVOUS/ANXIOUS: 1
HEARTBURN: 0

## 2022-07-29 ASSESSMENT — SOCIAL DETERMINANTS OF HEALTH (SDOH)
IN A TYPICAL WEEK, HOW MANY TIMES DO YOU TALK ON THE PHONE WITH FAMILY, FRIENDS, OR NEIGHBORS?: TWICE A WEEK
HOW OFTEN DO YOU GET TOGETHER WITH FRIENDS OR RELATIVES?: ONCE A WEEK
HOW OFTEN DO YOU ATTEND CHURCH OR RELIGIOUS SERVICES?: MORE THAN 4 TIMES PER YEAR
DO YOU BELONG TO ANY CLUBS OR ORGANIZATIONS SUCH AS CHURCH GROUPS UNIONS, FRATERNAL OR ATHLETIC GROUPS, OR SCHOOL GROUPS?: YES

## 2022-07-29 ASSESSMENT — LIFESTYLE VARIABLES
HOW OFTEN DO YOU HAVE SIX OR MORE DRINKS ON ONE OCCASION: NEVER
HOW OFTEN DO YOU HAVE A DRINK CONTAINING ALCOHOL: 2-3 TIMES A WEEK
SKIP TO QUESTIONS 9-10: 1
HOW MANY STANDARD DRINKS CONTAINING ALCOHOL DO YOU HAVE ON A TYPICAL DAY: 1 OR 2
AUDIT-C TOTAL SCORE: 3

## 2022-08-01 ENCOUNTER — OFFICE VISIT (OUTPATIENT)
Dept: PEDIATRICS | Facility: CLINIC | Age: 32
End: 2022-08-01
Payer: COMMERCIAL

## 2022-08-01 VITALS
TEMPERATURE: 97.7 F | WEIGHT: 139 LBS | DIASTOLIC BLOOD PRESSURE: 66 MMHG | SYSTOLIC BLOOD PRESSURE: 124 MMHG | BODY MASS INDEX: 22.34 KG/M2 | OXYGEN SATURATION: 100 % | RESPIRATION RATE: 18 BRPM | HEIGHT: 66 IN | HEART RATE: 60 BPM

## 2022-08-01 DIAGNOSIS — Z00.00 ROUTINE GENERAL MEDICAL EXAMINATION AT A HEALTH CARE FACILITY: Primary | ICD-10-CM

## 2022-08-01 DIAGNOSIS — Z11.4 SCREENING FOR HIV (HUMAN IMMUNODEFICIENCY VIRUS): ICD-10-CM

## 2022-08-01 DIAGNOSIS — F41.9 ANXIETY: ICD-10-CM

## 2022-08-01 DIAGNOSIS — Z13.220 LIPID SCREENING: ICD-10-CM

## 2022-08-01 DIAGNOSIS — Z11.59 NEED FOR HEPATITIS C SCREENING TEST: ICD-10-CM

## 2022-08-01 LAB
CHOLEST SERPL-MCNC: 146 MG/DL
FASTING STATUS PATIENT QL REPORTED: NO
HDLC SERPL-MCNC: 62 MG/DL
LDLC SERPL CALC-MCNC: 73 MG/DL
NONHDLC SERPL-MCNC: 84 MG/DL
TRIGL SERPL-MCNC: 55 MG/DL

## 2022-08-01 PROCEDURE — 99395 PREV VISIT EST AGE 18-39: CPT | Performed by: INTERNAL MEDICINE

## 2022-08-01 PROCEDURE — 86803 HEPATITIS C AB TEST: CPT | Performed by: INTERNAL MEDICINE

## 2022-08-01 PROCEDURE — 99213 OFFICE O/P EST LOW 20 MIN: CPT | Mod: 25 | Performed by: INTERNAL MEDICINE

## 2022-08-01 PROCEDURE — 80061 LIPID PANEL: CPT | Performed by: INTERNAL MEDICINE

## 2022-08-01 PROCEDURE — 87389 HIV-1 AG W/HIV-1&-2 AB AG IA: CPT | Performed by: INTERNAL MEDICINE

## 2022-08-01 PROCEDURE — 96127 BRIEF EMOTIONAL/BEHAV ASSMT: CPT | Performed by: INTERNAL MEDICINE

## 2022-08-01 PROCEDURE — 36415 COLL VENOUS BLD VENIPUNCTURE: CPT | Performed by: INTERNAL MEDICINE

## 2022-08-01 RX ORDER — SERTRALINE HYDROCHLORIDE 100 MG/1
100 TABLET, FILM COATED ORAL DAILY
Qty: 90 TABLET | Refills: 3 | Status: SHIPPED | OUTPATIENT
Start: 2022-08-01 | End: 2024-07-31

## 2022-08-01 RX ORDER — BUSPIRONE HYDROCHLORIDE 15 MG/1
15 TABLET ORAL 2 TIMES DAILY
Qty: 180 TABLET | Refills: 3 | Status: SHIPPED | OUTPATIENT
Start: 2022-08-01 | End: 2024-07-31

## 2022-08-01 ASSESSMENT — ENCOUNTER SYMPTOMS
DIZZINESS: 0
DYSURIA: 0
HEADACHES: 0
CHILLS: 0
ABDOMINAL PAIN: 0
PARESTHESIAS: 0
CONSTIPATION: 0
FEVER: 0
SHORTNESS OF BREATH: 0
COUGH: 0
NAUSEA: 0
HEMATOCHEZIA: 0
WEAKNESS: 0
HEARTBURN: 0
SORE THROAT: 0
ARTHRALGIAS: 0
HEMATURIA: 0
FREQUENCY: 0
MYALGIAS: 0
JOINT SWELLING: 0
NERVOUS/ANXIOUS: 1
EYE PAIN: 0
DIARRHEA: 0
PALPITATIONS: 0

## 2022-08-01 ASSESSMENT — ANXIETY QUESTIONNAIRES
3. WORRYING TOO MUCH ABOUT DIFFERENT THINGS: SEVERAL DAYS
5. BEING SO RESTLESS THAT IT IS HARD TO SIT STILL: SEVERAL DAYS
GAD7 TOTAL SCORE: 4
7. FEELING AFRAID AS IF SOMETHING AWFUL MIGHT HAPPEN: NOT AT ALL
GAD7 TOTAL SCORE: 4
6. BECOMING EASILY ANNOYED OR IRRITABLE: NOT AT ALL
IF YOU CHECKED OFF ANY PROBLEMS ON THIS QUESTIONNAIRE, HOW DIFFICULT HAVE THESE PROBLEMS MADE IT FOR YOU TO DO YOUR WORK, TAKE CARE OF THINGS AT HOME, OR GET ALONG WITH OTHER PEOPLE: NOT DIFFICULT AT ALL
1. FEELING NERVOUS, ANXIOUS, OR ON EDGE: SEVERAL DAYS
2. NOT BEING ABLE TO STOP OR CONTROL WORRYING: SEVERAL DAYS

## 2022-08-01 ASSESSMENT — PATIENT HEALTH QUESTIONNAIRE - PHQ9
SUM OF ALL RESPONSES TO PHQ QUESTIONS 1-9: 3
5. POOR APPETITE OR OVEREATING: NOT AT ALL

## 2022-08-01 ASSESSMENT — PAIN SCALES - GENERAL: PAINLEVEL: NO PAIN (0)

## 2022-08-01 NOTE — PROGRESS NOTES
SUBJECTIVE:   CC: Oniel Smart is an 31 year old male who presents for preventative health visit.       Patient has been advised of split billing requirements and indicates understanding: Yes  Healthy Habits:     Getting at least 3 servings of Calcium per day:  NO    Bi-annual eye exam:  Yes    Dental care twice a year:  Yes    Sleep apnea or symptoms of sleep apnea:  None    Diet:  Other    Frequency of exercise:  6-7 days/week    Duration of exercise:  45-60 minutes    Taking medications regularly:  Yes    Barriers to taking medications:  None    Medication side effects:  None    PHQ-2 Total Score: 1    Additional concerns today:  No    CINDI-7 SCORE 12/9/2021 2/3/2022 8/1/2022   Total Score 10 (moderate anxiety) 6 (mild anxiety) -   Total Score 10 6 4       PHQ 11/28/2021 12/9/2021 8/1/2022   PHQ-9 Total Score 9 8 3   Q9: Thoughts of better off dead/self-harm past 2 weeks Not at all Not at all Not at all               Today's PHQ-2 Score:   PHQ-2 ( 1999 [employer]) 7/29/2022   Q1: Little interest or pleasure in doing things 1   Q2: Feeling down, depressed or hopeless 0   PHQ-2 Score 1   PHQ-2 Total Score (12-17 Years)- Positive if 3 or more points; Administer PHQ-A if positive -   Q1: Little interest or pleasure in doing things Several days   Q2: Feeling down, depressed or hopeless Not at all   PHQ-2 Score 1       Abuse: Current or Past(Physical, Sexual or Emotional)- No  Do you feel safe in your environment? Yes    Have you ever done Advance Care Planning? (For example, a Health Directive, POLST, or a discussion with a medical provider or your loved ones about your wishes): No, advance care planning information given to patient to review.  Patient plans to discuss their wishes with loved ones or provider.      Social History     Tobacco Use     Smoking status: Never Smoker     Smokeless tobacco: Never Used   Substance Use Topics     Alcohol use: Yes     Comment: socially     If you drink alcohol do you typically have  ">3 drinks per day or >7 drinks per week? No    Alcohol Use 7/29/2022   Prescreen: >3 drinks/day or >7 drinks/week? No   No flowsheet data found.    Last PSA: No results found for: PSA    Reviewed orders with patient. Reviewed health maintenance and updated orders accordingly - Yes  Lab work is in process    Reviewed and updated as needed this visit by clinical staff   Tobacco  Allergies  Meds   Med Hx  Surg Hx  Fam Hx  Soc Hx          Reviewed and updated as needed this visit by Provider                       Review of Systems   Constitutional: Negative for chills and fever.   HENT: Negative for congestion, ear pain, hearing loss and sore throat.    Eyes: Positive for visual disturbance. Negative for pain.   Respiratory: Negative for cough and shortness of breath.    Cardiovascular: Negative for chest pain, palpitations and peripheral edema.   Gastrointestinal: Negative for abdominal pain, constipation, diarrhea, heartburn, hematochezia and nausea.   Genitourinary: Negative for dysuria, frequency, genital sores, hematuria, impotence, penile discharge and urgency.   Musculoskeletal: Negative for arthralgias, joint swelling and myalgias.   Skin: Negative for rash.   Neurological: Negative for dizziness, weakness, headaches and paresthesias.   Psychiatric/Behavioral: Negative for mood changes. The patient is nervous/anxious.          OBJECTIVE:   /66   Pulse 60   Temp 97.7  F (36.5  C) (Oral)   Resp 18   Ht 1.682 m (5' 6.24\")   Wt 63 kg (139 lb)   SpO2 100%   BMI 22.27 kg/m      Physical Exam  GENERAL: healthy, alert and no distress  EYES: Eyes grossly normal to inspection, PERRL and conjunctivae and sclerae normal  HENT: ear canals and TM's normal, nose and mouth without ulcers or lesions  NECK: no adenopathy, no asymmetry, masses, or scars and thyroid normal to palpation  RESP: lungs clear to auscultation - no rales, rhonchi or wheezes  CV: regular rate and rhythm, normal S1 S2, no S3 or S4, no " "murmur, click or rub, no peripheral edema and peripheral pulses strong  ABDOMEN: soft, nontender, no hepatosplenomegaly, no masses and bowel sounds normal  MS: no gross musculoskeletal defects noted, no edema  SKIN: no suspicious lesions or rashes  NEURO: Normal strength and tone, mentation intact and speech normal  PSYCH: mentation appears normal, affect normal/bright    Diagnostic Test Results:  Labs reviewed in Epic    ASSESSMENT/PLAN:       ICD-10-CM    1. Routine general medical examination at a health care facility  Z00.00    2. Anxiety   - Stable, PHQ and CINDI scores improved, no side effects with medications, refilled meds today for 1 year F41.9 busPIRone (BUSPAR) 15 MG tablet     sertraline (ZOLOFT) 100 MG tablet   3. Lipid screening  Z13.220 Lipid panel reflex to direct LDL Non-fasting   4. Screening for HIV (human immunodeficiency virus)  Z11.4 HIV Antigen Antibody Combo   5. Need for hepatitis C screening test  Z11.59 Hepatitis C Screen Reflex to HCV RNA Quant and Genotype           COUNSELING:   Reviewed preventive health counseling, as reflected in patient instructions    Estimated body mass index is 21.77 kg/m  as calculated from the following:    Height as of 11/2/21: 1.702 m (5' 7\").    Weight as of 11/2/21: 63 kg (139 lb).         He reports that he has never smoked. He has never used smokeless tobacco.      Counseling Resources:  ATP IV Guidelines  Pooled Cohorts Equation Calculator  FRAX Risk Assessment  ICSI Preventive Guidelines  Dietary Guidelines for Americans, 2010  USDA's MyPlate  ASA Prophylaxis  Lung CA Screening    Julia Delgado MD  [facility]  "

## 2022-08-01 NOTE — RESULT ENCOUNTER NOTE
Dear Oniel,    Your cholesterol panel is excellent, even without fasting!  I will contact you if any of the remaining labs are abnormal.  At this time, I do not recommend any changes to your current plan of care.    Please feel free to call with any questions.  Otherwise, we can discuss further at your next appointment.    Sincerely,    Julia Delgado MD

## 2022-08-02 LAB
HCV AB SERPL QL IA: NONREACTIVE
HIV 1+2 AB+HIV1 P24 AG SERPL QL IA: NONREACTIVE

## 2022-08-02 NOTE — RESULT ENCOUNTER NOTE
Dear Oniel,    The remainder of your lab results are normal.  At this time, I do not recommend any changes to your current plan of care.    Please feel free to call with any questions.      Sincerely,    Julia Delgado MD

## 2022-09-24 ENCOUNTER — HEALTH MAINTENANCE LETTER (OUTPATIENT)
Age: 32
End: 2022-09-24

## 2022-11-14 ENCOUNTER — TRANSFERRED RECORDS (OUTPATIENT)
Dept: HEALTH INFORMATION MANAGEMENT | Facility: CLINIC | Age: 32
End: 2022-11-14

## 2023-02-14 ENCOUNTER — OFFICE VISIT (OUTPATIENT)
Dept: PEDIATRICS | Facility: CLINIC | Age: 33
End: 2023-02-14
Payer: COMMERCIAL

## 2023-02-14 VITALS
DIASTOLIC BLOOD PRESSURE: 76 MMHG | BODY MASS INDEX: 22.75 KG/M2 | SYSTOLIC BLOOD PRESSURE: 124 MMHG | TEMPERATURE: 98.8 F | WEIGHT: 142 LBS | OXYGEN SATURATION: 98 % | HEART RATE: 91 BPM | RESPIRATION RATE: 16 BRPM

## 2023-02-14 DIAGNOSIS — M54.50 CHRONIC BILATERAL LOW BACK PAIN WITHOUT SCIATICA: Primary | ICD-10-CM

## 2023-02-14 DIAGNOSIS — G89.29 CHRONIC BILATERAL LOW BACK PAIN WITHOUT SCIATICA: Primary | ICD-10-CM

## 2023-02-14 DIAGNOSIS — M53.3 SACROILIAC JOINT PAIN: ICD-10-CM

## 2023-02-14 PROCEDURE — 99213 OFFICE O/P EST LOW 20 MIN: CPT | Performed by: INTERNAL MEDICINE

## 2023-02-14 RX ORDER — TIZANIDINE 2 MG/1
2 TABLET ORAL 3 TIMES DAILY PRN
Qty: 21 TABLET | Refills: 3 | Status: SHIPPED | OUTPATIENT
Start: 2023-02-14 | End: 2024-07-31

## 2023-02-14 ASSESSMENT — ENCOUNTER SYMPTOMS
BACK PAIN: 1
CONSTITUTIONAL NEGATIVE: 1
RESPIRATORY NEGATIVE: 1
GASTROINTESTINAL NEGATIVE: 1
NEUROLOGICAL NEGATIVE: 1
ALLERGIC/IMMUNOLOGIC NEGATIVE: 1
PSYCHIATRIC NEGATIVE: 1
EYES NEGATIVE: 1
HEMATOLOGIC/LYMPHATIC NEGATIVE: 1
ENDOCRINE NEGATIVE: 1
CARDIOVASCULAR NEGATIVE: 1

## 2023-02-14 ASSESSMENT — PAIN SCALES - GENERAL: PAINLEVEL: EXTREME PAIN (8)

## 2023-02-14 NOTE — PROGRESS NOTES
Assessment & Plan     Chronic bilateral low back pain without sciatica  - tiZANidine (ZANAFLEX) 2 MG tablet; Take 1 tablet (2 mg) by mouth 3 times daily as needed for muscle spasms  - Physical Therapy Referral; Future    Acute sacroiliac joint pain  - tiZANidine (ZANAFLEX) 2 MG tablet; Take 1 tablet (2 mg) by mouth 3 times daily as needed for muscle spasms  - Physical Therapy Referral; Future    Ongoing on and off chronic paralumbar msk pain with more recently acute right SI joint pain (started off bilateral but is worse on right).    No alarm signs.  Exam is significant for decreased flexibility of right hip compared to left, otherwise unremarkably.    Counseling on self/expectant care, PT, muscle relaxants.             Patient Instructions   When You Have Low Back Pain    Caring for Your Back  You are not alone.    Low back pain is very common. Nearly half of all adults have low back pain in any given year. The good news is that back pain is rarely a danger to your health. Most people can manage their back pain on their own and about half of them start feeling better within 2 weeks. In 9 out of 10 cases, low back pain goes away or no longer limits daily activity within 6 weeks.     Your outlook is good!    Your symptoms tell us that your low back pain is most likely not a danger to you. Most of the time we do not know the exact cause of low back pain, even if you see a doctor or have an MRI. However, treatment can still work without knowing the cause of the pain. Less than 1 in 100 people need surgery for their back pain.     What can I do about my low back pain?     There are three things you can do to ease low back pain and help it go away.    Use heat or cold packs.    Take medicine as directed.    Use positions, movements and exercises.     Using heat or cold packs    Try cold packs or gentle heat to ease your pain. Use whichever gives you the most relief. Apply the cold pack or heat for 15 minutes at a  time, as often as needed.    Taking medicine      If your doctor has prescribed medicine, be sure to follow the directions.    If you take over-the-counter medicine, read and follow the directions.    Talk to your doctor if you have any questions.     Using positions, movements and exercises    Research tells us that moving your joints and muscles can help you recover from back pain. Such activity should be simple and gentle. Use the positions in the photos as well as walking to help relieve your pain. Try taking a short walk every 3 to 4 hours during the day. Walk for a few minutes inside your home or take longer walks outside, on a treadmill or at a mall. Slowly increase the amount of time you walk. Expect discomfort when you begin, but it should lessen as your back starts to heal. When your back feels better, walk daily to keep your back and body healthy.    Finding a comfortable position    When your back pain is new, certain positions will ease your pain. Gently try each of the positions below until you find one that is helpful. Once you find a position of comfort, use it as often as you like when you are resting. You will recover faster if you combine rest with activity.         Lie on your back with your legs bent. You can do this by placing a pillow under your knees. Or you may lie on the floor and rest your lower legs on the seat of a chair.       Lie on your side with your knees bent, and place a pillow between your knees.       Lie on your stomach over pillows.      When should I call my doctor?    Your back pain should improve over the first couple of weeks. As it improves, you should be able to return to your normal activities. But call your doctor if:    You have a sudden change in your ability to control your bladder or bowels.    You feel tingling in your groin or legs.    The pain spreads down your leg and into your foot.    Your toes, feet or leg muscles feel weak.    You feel generally unwell or  sick.    Your pain does not get better or gets worse.      If you are deaf or hard of hearing, please let us know. We provide many free services including sign language interpreters,oral interpreters, TTYs, telephone amplifiers, note takers and written materials.    For informational purposes only. Not to replace the advice of your health care provider. Copyright   2013 [facility]. All rights reserved. [employer] [id number] - Rev 06/14.        Return in about 2 months (around 4/14/2023), or if symptoms worsen or fail to improve.    Julia Delgado MD  [facility] Engle is a 32 year old, presenting for the following health issues:  Back Pain      History of Present Illness       Back Pain:  He presents for follow up of back pain. Patient's back pain is a recurring problem.  Location of back pain:  Right lower back, left lower back, right buttock and right hip  Description of back pain: burning, dull ache, sharp and shooting  Back pain spreads: right buttocks    Since patient first noticed back pain, pain is: always present, but gets better and worse  Does back pain interfere with his job:  No      He eats 2-3 servings of fruits and vegetables daily.He consumes 0 sweetened beverage(s) daily.He exercises with enough effort to increase his heart rate 30 to 60 minutes per day.  He exercises with enough effort to increase his heart rate 6 days per week. He is missing 1 dose(s) of medications per week.  He is not taking prescribed medications regularly due to other.             Review of Systems   Constitutional: Negative.    HENT: Negative.    Eyes: Negative.    Respiratory: Negative.    Cardiovascular: Negative.    Gastrointestinal: Negative.    Endocrine: Negative.    Genitourinary: Negative.    Musculoskeletal: Positive for back pain.   Allergic/Immunologic: Negative.    Neurological: Negative.    Hematological: Negative.    Psychiatric/Behavioral: Negative.              Objective    /76   Pulse 91   Temp 98.8  F (37.1  C) (Tympanic)   Resp 16   Wt 64.4 kg (142 lb)   SpO2 98%   BMI 22.75 kg/m    Body mass index is 22.75 kg/m .  Physical Exam   GENERAL: healthy, alert and no distress  MS: no gross musculoskeletal defects noted, no edema  NEURO: Normal strength and tone, mentation intact and speech normal  Comprehensive back pain exam:  Tenderness of right SI joint region, Pain limits the following motions: right hip flexion, Lower extremity strength functional and equal on both sides, Lower extremity reflexes within normal limits bilaterally, Lower extremity sensation normal and equal on both sides and Straight leg raise negative bilaterally

## 2023-02-14 NOTE — PATIENT INSTRUCTIONS
When You Have Low Back Pain    Caring for Your Back  You are not alone.    Low back pain is very common. Nearly half of all adults have low back pain in any given year. The good news is that back pain is rarely a danger to your health. Most people can manage their back pain on their own and about half of them start feeling better within 2 weeks. In 9 out of 10 cases, low back pain goes away or no longer limits daily activity within 6 weeks.     Your outlook is good!    Your symptoms tell us that your low back pain is most likely not a danger to you. Most of the time we do not know the exact cause of low back pain, even if you see a doctor or have an MRI. However, treatment can still work without knowing the cause of the pain. Less than 1 in 100 people need surgery for their back pain.     What can I do about my low back pain?     There are three things you can do to ease low back pain and help it go away.   Use heat or cold packs.   Take medicine as directed.   Use positions, movements and exercises.     Using heat or cold packs    Try cold packs or gentle heat to ease your pain. Use whichever gives you the most relief. Apply the cold pack or heat for 15 minutes at a time, as often as needed.    Taking medicine     If your doctor has prescribed medicine, be sure to follow the directions.   If you take over-the-counter medicine, read and follow the directions.   Talk to your doctor if you have any questions.     Using positions, movements and exercises    Research tells us that moving your joints and muscles can help you recover from back pain. Such activity should be simple and gentle. Use the positions in the photos as well as walking to help relieve your pain. Try taking a short walk every 3 to 4 hours during the day. Walk for a few minutes inside your home or take longer walks outside, on a treadmill or at a mall. Slowly increase the amount of time you walk. Expect discomfort when you begin, but it should lessen  as your back starts to heal. When your back feels better, walk daily to keep your back and body healthy.    Finding a comfortable position    When your back pain is new, certain positions will ease your pain. Gently try each of the positions below until you find one that is helpful. Once you find a position of comfort, use it as often as you like when you are resting. You will recover faster if you combine rest with activity.         Lie on your back with your legs bent. You can do this by placing a pillow under your knees. Or you may lie on the floor and rest your lower legs on the seat of a chair.       Lie on your side with your knees bent, and place a pillow between your knees.       Lie on your stomach over pillows.      When should I call my doctor?    Your back pain should improve over the first couple of weeks. As it improves, you should be able to return to your normal activities. But call your doctor if:   You have a sudden change in your ability to control your bladder or bowels.   You feel tingling in your groin or legs.   The pain spreads down your leg and into your foot.   Your toes, feet or leg muscles feel weak.   You feel generally unwell or sick.   Your pain does not get better or gets worse.      If you are deaf or hard of hearing, please let us know. We provide many free services including sign language interpreters,oral interpreters, TTYs, telephone amplifiers, note takers and written materials.    For informational purposes only. Not to replace the advice of your health care provider. Copyright   2013 [place] [employer] [facility]. All rights reserved. [employer] 409768 - Rev 06/14.

## 2023-02-16 ENCOUNTER — THERAPY VISIT (OUTPATIENT)
Dept: PHYSICAL THERAPY | Facility: CLINIC | Age: 33
End: 2023-02-16
Attending: INTERNAL MEDICINE
Payer: COMMERCIAL

## 2023-02-16 DIAGNOSIS — M53.3 SACROILIAC JOINT PAIN: ICD-10-CM

## 2023-02-16 DIAGNOSIS — G89.29 CHRONIC BILATERAL LOW BACK PAIN WITHOUT SCIATICA: ICD-10-CM

## 2023-02-16 DIAGNOSIS — M54.50 CHRONIC BILATERAL LOW BACK PAIN WITHOUT SCIATICA: ICD-10-CM

## 2023-02-16 PROCEDURE — 97110 THERAPEUTIC EXERCISES: CPT | Mod: GP | Performed by: PHYSICAL THERAPIST

## 2023-02-16 PROCEDURE — 97161 PT EVAL LOW COMPLEX 20 MIN: CPT | Mod: GP | Performed by: PHYSICAL THERAPIST

## 2023-02-16 NOTE — PROGRESS NOTES
Physical Therapy Initial Evaluation  Subjective:  The history is provided by the patient. No  was used.   Patient Health History  Oniel LUIZ Smart being seen for Chronic lower back pain.     Problem began: 9/30/2022.   Problem occurred: Physical activity   Pain is reported as 6/10 on pain scale.  General health as reported by patient is excellent.  Pertinent medical history includes: depression, numbness/tingling, pain at night/rest and other. Other medical history details: GERD; stabbing/searing pain in lower back.     Medical allergies: other. Other medical allergies details: Bactrum.   Surgeries include:  None.    Current medications:  Anti-depressants, muscle relaxants and other. Other medications details: Famotidine and pantoprazole for GERD.    Current occupation is [employer].   Primary job tasks include:  Computer work.                  Therapist Generated HPI Evaluation  Problem details: Pt referred to therapy diagnosis of chronic bilateral low back pain without sciatica, acute SI joint pain.  History of low back pain. Last fall had episode of increased pain. Pain was off to right and left low back, in past was more in central low back.  Has had some pain in right buttock, but not in left. Worse in the morning, some difficulty standing up straight. Intermittently will disturb sleep. Able to play tennis 2x/week without increased pain. Like to run, runs 5-6x/week up to 4-5miles at time.  Lifts at home, some machine and free weights. .         Type of problem:  Lumbar.    This is a chronic condition.  Condition occurred with:  Insidious onset.  Where condition occurred: for unknown reasons.  Patient reports pain:  Lumbar spine left, lumbar spine right and central lumbar spine.  Pain is described as aching and sharp and is constant.  Pain radiates to:  Gluteals right. Pain is worse in the A.M..  Since onset symptoms are unchanged.  Symptoms are exacerbated by bending and sitting  and relieved  by activity/movement and rest.      Restrictions due to condition include:  Working in normal job without restrictions.  Barriers include:  None as reported by patient.                        Objective:  System         Lumbar/SI Evaluation  ROM:    AROM Lumbar:   Flexion:          Fingertips to feet; pain during and at end range.  Ext:                    Minimal limitation   Side Bend:        Left:  Moderate limitation, end range pain    Right:  Moderate limitation end range pain on left  Rotation:           Left:     Right:   Side Glide:        Left:     Right:         Strength: core stabilizers 5/5 able to perform bridge, plank, prone alt arm lifts with good form and strength.   Lumbar Myotomes:    T12-L3 (Hip Flex):  Left: 5    Right: 5  L2-4 (Quads):  Left:  5    Right:  5  L4 (Ankle DF):  Left:  5      L5 (Great Toe Ext): Left: 5    Right: 5   S1 (Toe Raise):  Left: 5    Right: 5      Lumbar Dermtomes:  normal                Neural Tension/Mobility:      Left side:SLR; SLR w/DF or Slump  negative.     Right side:   SLR w/DF; Slump or SLR  negative.   Lumbar Palpation:  normal            SI joint/Sacrum:    SI testing (-)                                                         Isela Lumbar Evaluation    Posture:  Sitting: fair  Standing: good  Lordosis: WNL  Lateral Shift: no        Test Movements:  FIS: During: increases  After: no worse    Repeat FIS: During: increases  After: worse    EIS: During: decreases  After: better    Repeat EIS: During: decreases  After: better                Principle of Treatment:      Extension: best in prone over 1 pillow with RODERICK and REIL with no pain. No pain with extension in standing, reports feels good.                                            ROS    Assessment/Plan:    Patient is a 32 year old male with lumbar complaints.    Patient has the following significant findings with corresponding treatment plan.                Diagnosis 1:  Chronic Bilateral LBP without  sciatica  Pain -  hot/cold therapy, manual therapy and home program  Decreased ROM/flexibility - manual therapy, therapeutic exercise and home program  Decreased joint mobility - manual therapy, therapeutic exercise and home program  Decreased strength - therapeutic exercise, therapeutic activities and home program    Therapy Evaluation Codes:   1) Clinical presentation characteristics are:   Stable/Uncomplicated.  2) Decision-Making    Low complexity using standardized patient assessment instrument and/or measureable assessment of functional outcome.  Cumulative Therapy Evaluation is: Low complexity.    Previous and current functional limitations:  (See Goal Flow Sheet for this information)    Short term and Long term goals: (See Goal Flow Sheet for this information)     Communication ability:  Patient appears to be able to clearly communicate and understand verbal and written communication and follow directions correctly.  Treatment Explanation - The following has been discussed with the patient:   RX ordered/plan of care  Anticipated outcomes  Possible risks and side effects  This patient would benefit from PT intervention to resume normal activities.   Rehab potential is good.    Frequency:  1 X week, once daily  Duration:  for 4 weeks tapering to 2 X a month over 4 weeks  Discharge Plan:  Achieve all LTG.  Independent in home treatment program.  Reach maximal therapeutic benefit.    Please refer to the daily flowsheet for treatment today, total treatment time and time spent performing 1:1 timed codes.

## 2023-03-02 ENCOUNTER — THERAPY VISIT (OUTPATIENT)
Dept: PHYSICAL THERAPY | Facility: CLINIC | Age: 33
End: 2023-03-02
Payer: COMMERCIAL

## 2023-03-02 DIAGNOSIS — G89.29 CHRONIC BILATERAL LOW BACK PAIN WITHOUT SCIATICA: Primary | ICD-10-CM

## 2023-03-02 DIAGNOSIS — M54.50 CHRONIC BILATERAL LOW BACK PAIN WITHOUT SCIATICA: Primary | ICD-10-CM

## 2023-03-02 PROCEDURE — 97110 THERAPEUTIC EXERCISES: CPT | Mod: GP | Performed by: PHYSICAL THERAPIST

## 2023-03-16 ENCOUNTER — THERAPY VISIT (OUTPATIENT)
Dept: PHYSICAL THERAPY | Facility: CLINIC | Age: 33
End: 2023-03-16
Payer: COMMERCIAL

## 2023-03-16 DIAGNOSIS — M54.50 CHRONIC BILATERAL LOW BACK PAIN WITHOUT SCIATICA: Primary | ICD-10-CM

## 2023-03-16 DIAGNOSIS — G89.29 CHRONIC BILATERAL LOW BACK PAIN WITHOUT SCIATICA: Primary | ICD-10-CM

## 2023-03-16 PROCEDURE — 97110 THERAPEUTIC EXERCISES: CPT | Mod: GP | Performed by: PHYSICAL THERAPIST

## 2023-04-06 ENCOUNTER — THERAPY VISIT (OUTPATIENT)
Dept: PHYSICAL THERAPY | Facility: CLINIC | Age: 33
End: 2023-04-06
Payer: COMMERCIAL

## 2023-04-06 DIAGNOSIS — G89.29 CHRONIC BILATERAL LOW BACK PAIN WITHOUT SCIATICA: Primary | ICD-10-CM

## 2023-04-06 DIAGNOSIS — M54.50 CHRONIC BILATERAL LOW BACK PAIN WITHOUT SCIATICA: Primary | ICD-10-CM

## 2023-04-06 PROCEDURE — 97110 THERAPEUTIC EXERCISES: CPT | Mod: GP | Performed by: PHYSICAL THERAPIST

## 2023-04-24 PROBLEM — G89.29 CHRONIC BILATERAL LOW BACK PAIN WITHOUT SCIATICA: Status: RESOLVED | Noted: 2023-02-16 | Resolved: 2023-04-24

## 2023-04-24 PROBLEM — M54.50 CHRONIC BILATERAL LOW BACK PAIN WITHOUT SCIATICA: Status: RESOLVED | Noted: 2023-02-16 | Resolved: 2023-04-24

## 2023-04-24 NOTE — PROGRESS NOTES
Discharge Note    Oniel has not scheduled further follow up and current status is unknown.  Please see information below for last relevant information on current status.  Patient seen for 4 visits. Recommended to follow up with physician if LE symptoms persist.     SUBJECTIVE  Subjective changes noted by patient:  Still having symptomsof numbness into right LE; still some intermittent LBP, but that is less intense. Can go a day without LBP, but has some numbness each day. In past few days numbness seems better, more intermittent.  .  Current pain level is 4/10.     Previous pain level was  6/10.   Changes in function:  Yes (See Goal flowsheet attached for changes in current functional level)  Adverse reaction to treatment or activity: None    OBJECTIVE  Changes noted in objective findings: Standing lumbar AROM full into flexion and extension and no pain. No symptoms with repeated lumbar testing. SI appears level. no production of right LE numbness today with exercises or ROM activities. SLR, SLR with DF, and slump all negative. Core strength is 5/5; resisted bilateral hip flex, ext,abd all 5/5 and painfree. Discussed following up with physician if numbness continues to persist right LE.     ASSESSMENT/PLAN  Diagnosis: chronic bilateral LBP without sciatica   Updated problem list and treatment plan:   Pain - HEP  Decreased ROM/flexibility - HEP  Decreased function - HEP  Decreased strength - HEP  STG/LTGs have been met or progress has been made towards goals:  Yes, please see goal flowsheet for most current information  Assessment of Progress: current status is unknown.    Last current status: Pt is progressing as expected   Self Management Plans:  HEP  I have re-evaluated this patient and find that the nature, scope, duration and intensity of the therapy is appropriate for the medical condition of the patient.  Oniel continues to require the following intervention to meet STG and LTG's:   HEP.    Recommendations:  Discharge with current home program.  Patient to follow up with MD as needed.    Please refer to the daily flowsheet for treatment today, total treatment time and time spent performing 1:1 timed codes.

## 2023-05-08 ENCOUNTER — E-VISIT (OUTPATIENT)
Dept: PEDIATRICS | Facility: CLINIC | Age: 33
End: 2023-05-08
Payer: COMMERCIAL

## 2023-05-08 DIAGNOSIS — G89.29 CHRONIC LOW BACK PAIN, UNSPECIFIED BACK PAIN LATERALITY, UNSPECIFIED WHETHER SCIATICA PRESENT: Primary | ICD-10-CM

## 2023-05-08 DIAGNOSIS — M54.50 CHRONIC LOW BACK PAIN, UNSPECIFIED BACK PAIN LATERALITY, UNSPECIFIED WHETHER SCIATICA PRESENT: Primary | ICD-10-CM

## 2023-05-08 PROCEDURE — 99207 PR NO CHARGE LOS: CPT | Performed by: INTERNAL MEDICINE

## 2023-05-16 ENCOUNTER — TELEPHONE (OUTPATIENT)
Dept: FAMILY MEDICINE | Facility: CLINIC | Age: 33
End: 2023-05-16
Payer: COMMERCIAL

## 2023-05-16 NOTE — TELEPHONE ENCOUNTER
PT saw his PCP for back pain in 2/2023.  Then pt saw PT.  PT may have some sciatica.  Pt is traveling to [place] from 5/23/23 to 6/2/23.  He will be f/u with a provider at [facility] on 6/14/23.    Pt just wanted to make sure he could be seen on 6/14/23 after being out of the country.  This appt on 6/14/23 is fine.  If pt does have resp sx once back in the country, he should do a home covid test.  A covid test may be good to do before his clinic visit, but it is not mandatory.  Please test if having resp sx.  Pt agreed.  SAL Hollingsworth

## 2023-10-08 ENCOUNTER — HEALTH MAINTENANCE LETTER (OUTPATIENT)
Age: 33
End: 2023-10-08

## 2024-07-26 ASSESSMENT — ANXIETY QUESTIONNAIRES
4. TROUBLE RELAXING: SEVERAL DAYS
1. FEELING NERVOUS, ANXIOUS, OR ON EDGE: SEVERAL DAYS
7. FEELING AFRAID AS IF SOMETHING AWFUL MIGHT HAPPEN: NOT AT ALL
5. BEING SO RESTLESS THAT IT IS HARD TO SIT STILL: SEVERAL DAYS
GAD7 TOTAL SCORE: 6
8. IF YOU CHECKED OFF ANY PROBLEMS, HOW DIFFICULT HAVE THESE MADE IT FOR YOU TO DO YOUR WORK, TAKE CARE OF THINGS AT HOME, OR GET ALONG WITH OTHER PEOPLE?: SOMEWHAT DIFFICULT
3. WORRYING TOO MUCH ABOUT DIFFERENT THINGS: SEVERAL DAYS
2. NOT BEING ABLE TO STOP OR CONTROL WORRYING: SEVERAL DAYS
IF YOU CHECKED OFF ANY PROBLEMS ON THIS QUESTIONNAIRE, HOW DIFFICULT HAVE THESE PROBLEMS MADE IT FOR YOU TO DO YOUR WORK, TAKE CARE OF THINGS AT HOME, OR GET ALONG WITH OTHER PEOPLE: SOMEWHAT DIFFICULT
7. FEELING AFRAID AS IF SOMETHING AWFUL MIGHT HAPPEN: NOT AT ALL
GAD7 TOTAL SCORE: 6
GAD7 TOTAL SCORE: 6
6. BECOMING EASILY ANNOYED OR IRRITABLE: SEVERAL DAYS

## 2024-07-26 ASSESSMENT — PATIENT HEALTH QUESTIONNAIRE - PHQ9
SUM OF ALL RESPONSES TO PHQ QUESTIONS 1-9: 6
SUM OF ALL RESPONSES TO PHQ QUESTIONS 1-9: 6
10. IF YOU CHECKED OFF ANY PROBLEMS, HOW DIFFICULT HAVE THESE PROBLEMS MADE IT FOR YOU TO DO YOUR WORK, TAKE CARE OF THINGS AT HOME, OR GET ALONG WITH OTHER PEOPLE: SOMEWHAT DIFFICULT

## 2024-07-31 ENCOUNTER — ANCILLARY PROCEDURE (OUTPATIENT)
Dept: GENERAL RADIOLOGY | Facility: CLINIC | Age: 34
End: 2024-07-31
Attending: PHYSICIAN ASSISTANT
Payer: COMMERCIAL

## 2024-07-31 ENCOUNTER — OFFICE VISIT (OUTPATIENT)
Dept: FAMILY MEDICINE | Facility: CLINIC | Age: 34
End: 2024-07-31
Payer: COMMERCIAL

## 2024-07-31 VITALS
BODY MASS INDEX: 22.02 KG/M2 | HEIGHT: 66 IN | HEART RATE: 71 BPM | SYSTOLIC BLOOD PRESSURE: 129 MMHG | RESPIRATION RATE: 14 BRPM | OXYGEN SATURATION: 99 % | TEMPERATURE: 98.2 F | DIASTOLIC BLOOD PRESSURE: 84 MMHG | WEIGHT: 137 LBS

## 2024-07-31 DIAGNOSIS — G89.29 CHRONIC BILATERAL LOW BACK PAIN WITH LEFT-SIDED SCIATICA: Primary | ICD-10-CM

## 2024-07-31 DIAGNOSIS — M54.42 CHRONIC BILATERAL LOW BACK PAIN WITH LEFT-SIDED SCIATICA: ICD-10-CM

## 2024-07-31 DIAGNOSIS — M54.42 CHRONIC BILATERAL LOW BACK PAIN WITH LEFT-SIDED SCIATICA: Primary | ICD-10-CM

## 2024-07-31 DIAGNOSIS — G89.29 CHRONIC BILATERAL LOW BACK PAIN WITH LEFT-SIDED SCIATICA: ICD-10-CM

## 2024-07-31 PROCEDURE — 99213 OFFICE O/P EST LOW 20 MIN: CPT | Performed by: PHYSICIAN ASSISTANT

## 2024-07-31 PROCEDURE — 72202 X-RAY EXAM SI JOINTS 3/> VWS: CPT | Mod: TC | Performed by: STUDENT IN AN ORGANIZED HEALTH CARE EDUCATION/TRAINING PROGRAM

## 2024-07-31 PROCEDURE — 81374 HLA I TYPING 1 ANTIGEN LR: CPT | Performed by: PHYSICIAN ASSISTANT

## 2024-07-31 PROCEDURE — 36415 COLL VENOUS BLD VENIPUNCTURE: CPT | Performed by: PHYSICIAN ASSISTANT

## 2024-07-31 PROCEDURE — G2211 COMPLEX E/M VISIT ADD ON: HCPCS | Performed by: PHYSICIAN ASSISTANT

## 2024-07-31 NOTE — PROGRESS NOTES
Assessment & Plan     Chronic bilateral low back pain with left-sided sciatica  Advised to continue with PT.  Will get HLAB27 to rule in or out ankylosing spondylitis. If positive well refer to rheumatology. In mean time will get XRAY of SI joint and refer to spine.  Currently back is not hurting today   - HLA-B27 Typing; Future  - Spine  Referral; Future  - XR Sacroiliac Joint G/E 3 Views; Future  - HLA-B27 Typing                Jim Engle is a 33 year old, presenting for the following health issues:  Back Pain    Has had a history of stiff sore low back but slowly worsening.   Chronic Low back pain since the fall of 2022 started on right lower back with sciatica.  Went to PT and it worked for a little bit and had been manageable and continues with this but no improvement. Now pain has switched to left side and not as acute.  Can't tolerate standing for any length of time and preventing him from being as active as he wants to be     Not seen spine or had HLAB27 testing done to rule out ankylosing spondylitis      7/31/2024     2:13 PM   Additional Questions   Roomed by Torres Sloan   Accompanied by self     History of Present Illness       Back Pain:  He presents for follow up of back pain. Patient's back pain is a chronic problem.  Location of back pain:  Right lower back and left lower back  Description of back pain: dull ache, gnawing and stabbing  Back pain spreads: nowhere    Since patient first noticed back pain, pain is: always present, but gets better and worse  Does back pain interfere with his job:  Not applicable       He eats 2-3 servings of fruits and vegetables daily.He consumes 0 sweetened beverage(s) daily.He exercises with enough effort to increase his heart rate 30 to 60 minutes per day.  He exercises with enough effort to increase his heart rate 5 days per week.   He is taking medications regularly.           Review of Systems  CONSTITUTIONAL: NEGATIVE for fever, chills, change  "in weight  INTEGUMENTARY/SKIN: NEGATIVE for worrisome rashes, moles or lesions  EYES: NEGATIVE for vision changes or irritation  ENT/MOUTH: NEGATIVE for ear, mouth and throat problems  RESP: NEGATIVE for significant cough or SOB  BREAST: NEGATIVE for masses, tenderness or discharge  CV: NEGATIVE for chest pain, palpitations or peripheral edema  GI: NEGATIVE for nausea, abdominal pain, heartburn, or change in bowel habits  : NEGATIVE for frequency, dysuria, or hematuria  MUSCULOSKELETAL:back pain  NEURO: NEGATIVE for weakness, dizziness or paresthesias  ENDOCRINE: NEGATIVE for temperature intolerance, skin/hair changes  HEME: NEGATIVE for bleeding problems  PSYCHIATRIC: NEGATIVE for changes in mood or affect      Objective    /84 (BP Location: Right arm, Patient Position: Chair, Cuff Size: Adult Small)   Pulse 71   Temp 98.2  F (36.8  C) (Oral)   Resp 14   Ht 1.683 m (5' 6.25\")   Wt 62.1 kg (137 lb)   SpO2 99%   BMI 21.95 kg/m    Body mass index is 21.95 kg/m .  Physical Exam   GENERAL: alert and no distress  RESP: lungs clear to auscultation - no rales, rhonchi or wheezes  CV: regular rate and rhythm, normal S1 S2, no S3 or S4, no murmur, click or rub, no peripheral edema   Comprehensive back pain exam:  Tenderness of Left SI.  Full ROM straight leg negative             Signed Electronically by: Ramona Ann Aaseby-Aguilera, PA-C    "

## 2024-08-07 LAB
B LOCUS: NORMAL
B27TEST METHOD: NORMAL

## 2024-10-03 NOTE — PROGRESS NOTES
PHYSICAL MEDICINE & REHABILITATION / MEDICAL SPINE        Date:  Oct 4, 2024    Name:  Oniel Smart  YOB: 1990  MRN:  [id number]          REASON FOR CONSULTATION:  Chronic low back pain with left-sided sciatica.        REFERRING PROVIDER:  Ramona Ann Aaseby-Aguilera, PA        CHART REVIEW:  Reviewed 07/31/2024 note from Ramona Ann Aaseby-Aguilera, PA (family medicine).  Mr. Oniel Smart had bilateral low back pain, described as dull ache, gnawing, stabbing.  There was no radiating pain.  Pain was constant but did have a waxing waning course.  Mr. Oniel Smart had chronic low back pain since fall 2022.  At that time, he had some right sided sciatica.  Now, his pain was on the left side.  He had limited ability to stand.  Mr. Oniel Smart had physical therapy.  Referral to medical spine was placed.        HISTORY OF PRESENT ILLNESS:  Mr. Oniel Smart is a 34-year-old male.  He is .  Mr. Oniel Smart works for the [employer].  Mr. Oniel Smart ran cross-country and track for the [employer].  Now, Mr. Oniel Smart does less running, but he plays tennis, golfs, lifts weights.    Mr. Oniel Smart stated in fall 2022 he had episode of right-sided sciatica that improved with physical therapy.  Mr. Oniel Smart had a right great toe fracture in the past that was managed conservatively.  Mr. Oniel Smart denies any other injuries or surgeries of his mid back, low back, pelvis, hips, thighs, knees, legs, ankles, feet, toes.    Mr. Oniel Smart denies any personal or family history of autoimmune diseases, rheumatologic diseases, gout, pseudogout.  He denies any personal or family history of neurologic diseases.  Mr. Smart denies any personal or family history of inflammatory bowel diseases.    Mr. Oniel Smart began noting left low back/buttock pain in fall 2023.  Currently, Mr. Oniel Smart has bilateral low back pain this worse on his left than his right.  This pain is  "constant and described as \"dull.\"  His pain is without radiation.  Position changes and lying down seem to provide some alleviation.  Standing and sitting both worsen his low back pain, but standing causes more pain than sitting.    Mr. Oniel Smart currently rates his low back pain as 3/10.  His low back pain varies from 2/10 to 9/10.  Mr. Oniel Smart notes that a violent sneeze will worsen his low back pain.  Coughing does not change his low back pain.  Driving and hitting a pothole can worsen Mr. Oniel Smart's low back pain.  His low back pain does not keep him awake but can wake him.    Mr. Oniel Smart notes some weakness in his low back.  He denies any catching, locking, popping.  Mr. Oniel Smart denies any bruising, redness, swelling.  He denies any give way episodes of his lower extremities.  Mr. Oniel Smart denies any tripping, stumbling, falling.  He is not using any assistive devices for ambulation.  Mr. Oniel Smart denies any numbness, tingling, pins-and-needles.  He denies any saddle anesthesia, bowel incontinence, bladder incontinence.  Mr. Oniel Smart denies that his bilateral lower extremities become weak and tired with walking    Mr. Oniel Smart has not tried acupuncture, chiropractor treatments, injections, massage.  He really has not had much benefit with physical therapy.  He has also tried heat, ice, acetaminophen, and ibuprofen.        REVIEW OF SYSTEMS:  Review of Systems     Constitutional:  Negative for fever, weight loss, weight gain and fatigue.   HENT:  Negative for tinnitus, trouble swallowing and hoarse voice.    Eyes:  Negative for eye pain, eye pain and decreased vision.   Respiratory:   Negative for cough, shortness of breath and wheezing.    Cardiovascular:  Negative for chest pain, palpitations and leg swelling.   Gastrointestinal:  Positive for heartburn. Negative for nausea, vomiting, abdominal pain, diarrhea, constipation, blood in stool and bowel incontinence. "   Genitourinary:  Negative for bladder incontinence, dysuria, hematuria and difficulty urinating.   Musculoskeletal:  Positive for myalgias. Negative for arthralgias.   Skin:  Negative for itching, poor wound healing and poor wound healing.   Neurological:  Negative for dizziness, seizures, loss of consciousness, headaches and difficulty walking.   Endo/Heme:  Negative for anemia, swollen glands and bruises/bleeds easily.   Psychiatric/Behavioral:  Negative for depression.          ALLERGIES:  Allergies   Allergen Reactions    Fructose Cramps and GI Disturbance    Sulfamethoxazole-Trimethoprim Hives and Rash         MEDICATIONS:  No current outpatient medications on file.         PAST MEDICAL HISTORY:  Past Medical History:   Diagnosis Date    Depressive disorder          PAST SURGICAL HISTORY:  Past Surgical History:   Procedure Laterality Date    COLONOSCOPY  2016         FAMILY HISTORY:  Family History   Problem Relation Age of Onset    Colon Cancer Father     Prostate Cancer Maternal Grandfather     Thyroid Disease Mother          SOCIAL HISTORY:  Social History     Socioeconomic History    Marital status:      Spouse name: Not on file    Number of children: Not on file    Years of education: Not on file    Highest education level: Not on file   Occupational History    Not on file   Tobacco Use    Smoking status: Never    Smokeless tobacco: Never   Substance and Sexual Activity    Alcohol use: Yes     Comment: socially    Drug use: Never    Sexual activity: Yes     Partners: Female     Birth control/protection: Condom   Other Topics Concern    Parent/sibling w/ CABG, MI or angioplasty before 65F 55M? No   Social History Narrative    Not on file     Social Determinants of Health     Financial Resource Strain: Low Risk  (7/29/2022)    Overall Financial Resource Strain (CARDIA)     Difficulty of Paying Living Expenses: Not hard at all   Food Insecurity: No Food Insecurity (7/29/2022)    Hunger Vital Sign      Worried About Running Out of Food in the Last Year: Never true     Ran Out of Food in the Last Year: Never true   Transportation Needs: No Transportation Needs (7/29/2022)    PRAPARE - Transportation     Lack of Transportation (Medical): No     Lack of Transportation (Non-Medical): No   Physical Activity: Sufficiently Active (7/29/2022)    Exercise Vital Sign     Days of Exercise per Week: 6 days     Minutes of Exercise per Session: 30 min   Stress: Stress Concern Present (7/29/2022)    [employer] - Occupational Stress Questionnaire     Feeling of Stress : To some extent   Social Connections: Socially Integrated (7/29/2022)    Social Connection and Isolation Panel [NHANES]     Frequency of Communication with Friends and Family: Twice a week     Frequency of Social Gatherings with Friends and Family: Once a week     Attends Orthodoxy Services: More than 4 times per year     Active Member of Clubs or Organizations: Yes     Attends Club or Organization Meetings: Not on file     Marital Status:    Interpersonal Safety: Low Risk  (7/31/2024)    Interpersonal Safety     Do you feel physically and emotionally safe where you currently live?: Yes     Within the past 12 months, have you been hit, slapped, kicked or otherwise physically hurt by someone?: No     Within the past 12 months, have you been humiliated or emotionally abused in other ways by your partner or ex-partner?: No   Housing Stability: Low Risk  (7/29/2022)    Housing Stability Vital Sign     Unable to Pay for Housing in the Last Year: No     Number of Places Lived in the Last Year: 1     Unstable Housing in the Last Year: No         PHYSICAL EXAMINATION:  Vitals:    10/04/24 0931   BP: (!) 147/76   Pulse: 81   SpO2: 100%       GENERAL:  No acute distress.  Pleasant and cooperative.   PSYCH:  Normal mood and affect.  HEAD:  Normocephalic.  SPEECH:  No dysarthria.  EYES:  No scleral icterus.  EARS:  Hearing is intact to  spoken voice.  NOSE:  Midline, symmetric, no rhinorrhea.  LUNGS:  No respiratory distress.  No increased work of breathing.  VASCULAR/PULSES:  Posterior tibial:  Right 2+.  Left 2+.  Dorsalis pedis:  Right 2+.  Left 2+.  LOWER EXTREMITIES: No clubbing, cyanosis, or edema bilaterally.    BALANCE AND GAIT: The patient has a reciprocal gait pattern without antalgia.  He is able to toe walk, heel walk, and tandem walk without difficulty.  Double leg squat is performed normally.  Right single-leg squat is performed normally.  Left single-leg squat is performed normally.    INSPECTION:  There is no erythema, ecchymosis, deformity, asymmetry, or abnormality of the low back.  There is subtle thoracic scoliosis convex left.    LUMBOPELVIC PALPATION:  Lumbar Spinous Processes:  not tender.  Lumbar Paraspinals:  Right not tender.  Left not tender.  Posterior Superior Iliac Spine:  Right not tender.  Left not tender.  Superior Cluneal Nerves:  Right not tender.  Left not tender.  Iliac Crest:  Right not tender.  Left not tender.  Sacroiliac Joint:  Right not tender.  Left not tender.  Hip External Rotators:  Right not tender.  Left not tender.  Ischial Tuberosity:  Right not tender.  Left not tender.  Greater Trochanter:  Right not tender.  Left not tender.  Coccyx:  not tender.    THORACOLUMBAR RANGE OF MOTION:  Forward flexion (85 ): Normal range of motion, causes slight increase in low back pain, does not cause radiating pain.  Extension (30 ): Normal range of motion, does not cause pain, does not cause radiating pain.  Lateral bending right (30 ):  Normal range of motion, does not cause pain, does not cause radiating pain.  Lateral bending left (30 ):  Normal range of motion, does not cause pain, does not cause radiating pain.  Twisting right with extension: Normal range of motion, causes slight increase in low back pain, does not cause radiating pain.  Twisting left with extension:  Normal range of motion, causes slight  increase in low back pain, does not cause radiating pain.    SACROILIAC RANGE OF MOTION:  Standing flexion:  Right -.  Left -.  Seated flexion:  Right -.  Left -.  One-leg stork (Gillet):  Right -.  Left -.  Sacroiliac joint dysfunction:  Right -.  Left -.    HIP RANGE OF MOTION:  Flexion (110 ):  Right 115 .  Left 115 .  Extension (30 ):  Right 30 .  Left 30 .  External rotation (45 ):  Right 40 .  Left 20 .  Internal rotation (35 ):  Right 30 .  Left 50 .    KNEE RANGE OF MOTION:  Extension (0 ):  Right 0 .  Left 0 .  Flexion (135 ):  Right 145 .  Left 145 .    STRENGTH:  Hip flexion:  Right 5/5.  Left 5/5.  Hip abduction:  Right 5/5.  Left 5/5.  Hip external rotation:  Right 5/5.  Left 5/5.  Hip extension:  Right 5/5.  Left 5/5.  Knee extension:  Right 5/5.  Left 5/5.  Knee flexion:  Right 5/5.  Left 5/5.  Ankle dorsiflexion:  Right 5/5.  Left 5/5.  Great toe dorsiflexion:  Right 5/5.  Left 5/5.  Ankle plantar flexion:  Right 5/5.  Left 5/5.    SENSATION:  Intact to light touch along right L2, L3, L4, L5, S1, S2.  Intact to light touch along left L2, L3, L4, L5, S1, S2.    REFLEXES:  Patellar (L2-L4):  Right 2+.  Left 2+.  Medial hamstrings (L5-S1):  Right 2+.  Left 2+.  Achilles (S1-S2):  Right 2+.  Left 2+.  Babinski:  Right downgoing.  Left downgoing.  Forced ankle dorsiflexion (clonus):  Right 0 beats.  Left 0 point.    LUMBOPELVIC SPECIAL TESTS:  Lizeth finger:  Right -.  Left -.  Gapping / Distraction:  Right -.  Left -.  Log roll:  Right -.  Left -.  Straight-leg raise (Lasegue):  Right -.  Left -.  Crossed-straight leg raise:  Right -.  Left -.  Resisted straight leg raise:  Right -.  Left -.  FABERE (Spencer):  Right -.  Left -.  FADIR:  Right -.  Left -.  Hip scour:  Right -.  Left -.  Lateral sacral compression:  Right -.  Left -.  Clamshell:  Right -.  Left -.  Femoral nerve stretch:  Right -.  Left -.  Crossed femoral nerve stretch:  Right -.  Left -.  Ely s:  Right -.  Left -.  Yeoman s:  Right -.   Left -.  Midline sacral thrust:  Right -.  Left -.  Noble compression:  Right -.  Left -.          IMAGING:  XR SACROILIAC JOINT G/E 3 VIEWS 7/31/2024 3:15 PM      HISTORY: Chronic bilateral low back pain with left-sided sciatica; Chronic bilateral low back pain with left-sided sciatica     COMPARISON: None.    IMPRESSION:     Normal joint spaces and alignment. No fracture. No erosions.          ASSESSMENT/PLAN:  Mr. Oniel Smart is a 34-year-old male.  He has low back pain that is currently worse on the left than on the right.  Mr. Oniel Smart has slight thoracic scoliosis convex left.  He does have both discogenic and facetogenic components to his low back pain.  Mr. Oniel Smart is noted to have an asymmetry in his pelvis as the range of motion between his hips is different.  Discussed diagnoses, pathophysiologies, further workup, and treatment options with Mr. Oniel Smart.  Discussed the option of doing nothing/living with it, physical therapy, imaging of the low back and imaging of the hips.  Mr. Oniel Smart is to get advanced imaging of his pelvis and lumbar spine.  He is also being sent for scoliosis series x-rays and flexion-extension x-rays of his lumbar spine.  Mr. Oniel Smart is follow-up in this clinic after his imaging is complete.        Total Time on encounter:  52 minutes were spent on one more or more of the following:  discussion with patient, history, exam, coordinating care, treatment goals, record review, documenting clinical information, and/or data review.      Alvin Huang MD

## 2024-10-04 ENCOUNTER — OFFICE VISIT (OUTPATIENT)
Dept: NEUROSURGERY | Facility: CLINIC | Age: 34
End: 2024-10-04
Attending: PHYSICIAN ASSISTANT
Payer: COMMERCIAL

## 2024-10-04 VITALS — HEART RATE: 81 BPM | DIASTOLIC BLOOD PRESSURE: 76 MMHG | OXYGEN SATURATION: 100 % | SYSTOLIC BLOOD PRESSURE: 147 MMHG

## 2024-10-04 DIAGNOSIS — M51.370 DEGENERATION OF INTERVERTEBRAL DISC OF LUMBOSACRAL REGION WITH DISCOGENIC BACK PAIN: ICD-10-CM

## 2024-10-04 DIAGNOSIS — M41.9 SCOLIOSIS OF THORACIC SPINE, UNSPECIFIED SCOLIOSIS TYPE: ICD-10-CM

## 2024-10-04 DIAGNOSIS — M47.817 FACET ARTHROPATHY, LUMBOSACRAL: ICD-10-CM

## 2024-10-04 DIAGNOSIS — G89.29 CHRONIC BILATERAL LOW BACK PAIN WITH LEFT-SIDED SCIATICA: ICD-10-CM

## 2024-10-04 DIAGNOSIS — R29.898 ASYMMETRIC HIPS: Primary | ICD-10-CM

## 2024-10-04 DIAGNOSIS — M54.42 CHRONIC BILATERAL LOW BACK PAIN WITH LEFT-SIDED SCIATICA: ICD-10-CM

## 2024-10-04 PROCEDURE — 99244 OFF/OP CNSLTJ NEW/EST MOD 40: CPT | Performed by: PHYSICAL MEDICINE & REHABILITATION

## 2024-10-04 ASSESSMENT — ENCOUNTER SYMPTOMS
PALPITATIONS: 0
SEIZURES: 0
NAUSEA: 0
LOSS OF CONSCIOUSNESS: 0
DIFFICULTY URINATING: 0
HEMATURIA: 0
WEIGHT LOSS: 0
ARTHRALGIAS: 0
HEARTBURN: 1
INSOMNIA: 0
DEPRESSION: 0
FATIGUE: 0
MYALGIAS: 1
HEADACHES: 0
LEG SWELLING: 0
SHORTNESS OF BREATH: 0
BOWEL INCONTINENCE: 0
POOR WOUND HEALING: 0
VOMITING: 0
NERVOUS/ANXIOUS: 0
COUGH: 0
ABDOMINAL PAIN: 0
WEIGHT GAIN: 0
SWOLLEN GLANDS: 0
HOARSE VOICE: 0
WHEEZING: 0
EYE PAIN: 0
CONSTIPATION: 0
DIARRHEA: 0
DYSURIA: 0
DIZZINESS: 0
TROUBLE SWALLOWING: 0
BRUISES/BLEEDS EASILY: 0
FEVER: 0
BLOOD IN STOOL: 0

## 2024-10-04 ASSESSMENT — PAIN SCALES - GENERAL: PAINLEVEL: MILD PAIN (3)

## 2024-10-04 NOTE — LETTER
10/4/2024       RE: Oniel Smart  [address] [facility] [address]     Dear Colleague,    Thank you for referring your patient, Oniel Smart, to the  [facility] at [facility]. Please see a copy of my visit note below.    PHYSICAL MEDICINE & REHABILITATION / MEDICAL SPINE        Date:  Oct 4, 2024    Name:  Oniel Smart  YOB: 1990  MRN:  [id number]          REASON FOR CONSULTATION:  Chronic low back pain with left-sided sciatica.        REFERRING PROVIDER:  Ramona Ann Aaseby-Aguilera, PA        CHART REVIEW:  Reviewed 07/31/2024 note from Ramona Ann Aaseby-Aguilera, PA (family medicine).  Mr. Oniel Smart had bilateral low back pain, described as dull ache, gnawing, stabbing.  There was no radiating pain.  Pain was constant but did have a waxing waning course.  Mr. Oniel Smart had chronic low back pain since fall 2022.  At that time, he had some right sided sciatica.  Now, his pain was on the left side.  He had limited ability to stand.  Mr. Oniel Smart had physical therapy.  Referral to medical spine was placed.        HISTORY OF PRESENT ILLNESS:  Mr. Oniel Smart is a 34-year-old male.  He is .  Mr. Oniel Smart works for the [employer].  Mr. Oniel Smart ran cross-country and track for the [employer].  Now, Mr. Oniel Smart does less running, but he plays tennis, golfs, lifts weights.    Mr. Oniel Smart stated in fall 2022 he had episode of right-sided sciatica that improved with physical therapy.  Mr. Oniel Smart had a right great toe fracture in the past that was managed conservatively.  Mr. Oniel Smart denies any other injuries or surgeries of his mid back, low back, pelvis, hips, thighs, knees, legs, ankles, feet, toes.    Mr. Oniel Smart denies any personal or family history of autoimmune diseases, rheumatologic diseases, gout, pseudogout.  He denies any personal or family history  "of neurologic diseases.  Mr. Smart denies any personal or family history of inflammatory bowel diseases.    Mr. Oniel Smart began noting left low back/buttock pain in fall 2023.  Currently, Mr. Oniel Smart has bilateral low back pain this worse on his left than his right.  This pain is constant and described as \"dull.\"  His pain is without radiation.  Position changes and lying down seem to provide some alleviation.  Standing and sitting both worsen his low back pain, but standing causes more pain than sitting.    Mr. Oniel Smart currently rates his low back pain as 3/10.  His low back pain varies from 2/10 to 9/10.  Mr. Oniel Smart notes that a violent sneeze will worsen his low back pain.  Coughing does not change his low back pain.  Driving and hitting a pothole can worsen Mr. Oniel Smart's low back pain.  His low back pain does not keep him awake but can wake him.    Mr. Oniel Smart notes some weakness in his low back.  He denies any catching, locking, popping.  Mr. Oniel Smart denies any bruising, redness, swelling.  He denies any give way episodes of his lower extremities.  Mr. Oniel Smart denies any tripping, stumbling, falling.  He is not using any assistive devices for ambulation.  Mr. Oniel Smart denies any numbness, tingling, pins-and-needles.  He denies any saddle anesthesia, bowel incontinence, bladder incontinence.  Mr. Oniel Smart denies that his bilateral lower extremities become weak and tired with walking    Mr. Oniel Smart has not tried acupuncture, chiropractor treatments, injections, massage.  He really has not had much benefit with physical therapy.  He has also tried heat, ice, acetaminophen, and ibuprofen.        REVIEW OF SYSTEMS:  Review of Systems     Constitutional:  Negative for fever, weight loss, weight gain and fatigue.   HENT:  Negative for tinnitus, trouble swallowing and hoarse voice.    Eyes:  Negative for eye pain, eye pain and decreased vision. "   Respiratory:   Negative for cough, shortness of breath and wheezing.    Cardiovascular:  Negative for chest pain, palpitations and leg swelling.   Gastrointestinal:  Positive for heartburn. Negative for nausea, vomiting, abdominal pain, diarrhea, constipation, blood in stool and bowel incontinence.   Genitourinary:  Negative for bladder incontinence, dysuria, hematuria and difficulty urinating.   Musculoskeletal:  Positive for myalgias. Negative for arthralgias.   Skin:  Negative for itching, poor wound healing and poor wound healing.   Neurological:  Negative for dizziness, seizures, loss of consciousness, headaches and difficulty walking.   Endo/Heme:  Negative for anemia, swollen glands and bruises/bleeds easily.   Psychiatric/Behavioral:  Negative for depression.          ALLERGIES:  Allergies   Allergen Reactions     Fructose Cramps and GI Disturbance     Sulfamethoxazole-Trimethoprim Hives and Rash         MEDICATIONS:  No current outpatient medications on file.         PAST MEDICAL HISTORY:  Past Medical History:   Diagnosis Date     Depressive disorder          PAST SURGICAL HISTORY:  Past Surgical History:   Procedure Laterality Date     COLONOSCOPY  2016         FAMILY HISTORY:  Family History   Problem Relation Age of Onset     Colon Cancer Father      Prostate Cancer Maternal Grandfather      Thyroid Disease Mother          SOCIAL HISTORY:  Social History     Socioeconomic History     Marital status:      Spouse name: Not on file     Number of children: Not on file     Years of education: Not on file     Highest education level: Not on file   Occupational History     Not on file   Tobacco Use     Smoking status: Never     Smokeless tobacco: Never   Substance and Sexual Activity     Alcohol use: Yes     Comment: socially     Drug use: Never     Sexual activity: Yes     Partners: Female     Birth control/protection: Condom   Other Topics Concern     Parent/sibling w/ CABG, MI or angioplasty before  65F 55M? No   Social History Narrative     Not on file     Social Determinants of Health     Financial Resource Strain: Low Risk  (7/29/2022)    Overall Financial Resource Strain (CARDIA)      Difficulty of Paying Living Expenses: Not hard at all   Food Insecurity: No Food Insecurity (7/29/2022)    Hunger Vital Sign      Worried About Running Out of Food in the Last Year: Never true      Ran Out of Food in the Last Year: Never true   Transportation Needs: No Transportation Needs (7/29/2022)    PRAPARE - Transportation      Lack of Transportation (Medical): No      Lack of Transportation (Non-Medical): No   Physical Activity: Sufficiently Active (7/29/2022)    Exercise Vital Sign      Days of Exercise per Week: 6 days      Minutes of Exercise per Session: 30 min   Stress: Stress Concern Present (7/29/2022)    [employer] [place] [employer] - Occupational Stress Questionnaire      Feeling of Stress : To some extent   Social Connections: Socially Integrated (7/29/2022)    Social Connection and Isolation Panel [NHANES]      Frequency of Communication with Friends and Family: Twice a week      Frequency of Social Gatherings with Friends and Family: Once a week      Attends Congregational Services: More than 4 times per year      Active Member of Clubs or Organizations: Yes      Attends Club or Organization Meetings: Not on file      Marital Status:    Interpersonal Safety: Low Risk  (7/31/2024)    Interpersonal Safety      Do you feel physically and emotionally safe where you currently live?: Yes      Within the past 12 months, have you been hit, slapped, kicked or otherwise physically hurt by someone?: No      Within the past 12 months, have you been humiliated or emotionally abused in other ways by your partner or ex-partner?: No   Housing Stability: Low Risk  (7/29/2022)    Housing Stability Vital Sign      Unable to Pay for Housing in the Last Year: No      Number of Places Lived in the Last Year: 1       Unstable Housing in the Last Year: No         PHYSICAL EXAMINATION:  Vitals:    10/04/24 0931   BP: (!) 147/76   Pulse: 81   SpO2: 100%       GENERAL:  No acute distress.  Pleasant and cooperative.   PSYCH:  Normal mood and affect.  HEAD:  Normocephalic.  SPEECH:  No dysarthria.  EYES:  No scleral icterus.  EARS:  Hearing is intact to spoken voice.  NOSE:  Midline, symmetric, no rhinorrhea.  LUNGS:  No respiratory distress.  No increased work of breathing.  VASCULAR/PULSES:  Posterior tibial:  Right 2+.  Left 2+.  Dorsalis pedis:  Right 2+.  Left 2+.  LOWER EXTREMITIES: No clubbing, cyanosis, or edema bilaterally.    BALANCE AND GAIT: The patient has a reciprocal gait pattern without antalgia.  He is able to toe walk, heel walk, and tandem walk without difficulty.  Double leg squat is performed normally.  Right single-leg squat is performed normally.  Left single-leg squat is performed normally.    INSPECTION:  There is no erythema, ecchymosis, deformity, asymmetry, or abnormality of the low back.  There is subtle thoracic scoliosis convex left.    LUMBOPELVIC PALPATION:  Lumbar Spinous Processes:  not tender.  Lumbar Paraspinals:  Right not tender.  Left not tender.  Posterior Superior Iliac Spine:  Right not tender.  Left not tender.  Superior Cluneal Nerves:  Right not tender.  Left not tender.  Iliac Crest:  Right not tender.  Left not tender.  Sacroiliac Joint:  Right not tender.  Left not tender.  Hip External Rotators:  Right not tender.  Left not tender.  Ischial Tuberosity:  Right not tender.  Left not tender.  Greater Trochanter:  Right not tender.  Left not tender.  Coccyx:  not tender.    THORACOLUMBAR RANGE OF MOTION:  Forward flexion (85 ): Normal range of motion, causes slight increase in low back pain, does not cause radiating pain.  Extension (30 ): Normal range of motion, does not cause pain, does not cause radiating pain.  Lateral bending right (30 ):  Normal range of motion, does not cause pain,  does not cause radiating pain.  Lateral bending left (30 ):  Normal range of motion, does not cause pain, does not cause radiating pain.  Twisting right with extension: Normal range of motion, causes slight increase in low back pain, does not cause radiating pain.  Twisting left with extension:  Normal range of motion, causes slight increase in low back pain, does not cause radiating pain.    SACROILIAC RANGE OF MOTION:  Standing flexion:  Right -.  Left -.  Seated flexion:  Right -.  Left -.  One-leg stork (Gillet):  Right -.  Left -.  Sacroiliac joint dysfunction:  Right -.  Left -.    HIP RANGE OF MOTION:  Flexion (110 ):  Right 115 .  Left 115 .  Extension (30 ):  Right 30 .  Left 30 .  External rotation (45 ):  Right 40 .  Left 20 .  Internal rotation (35 ):  Right 30 .  Left 50 .    KNEE RANGE OF MOTION:  Extension (0 ):  Right 0 .  Left 0 .  Flexion (135 ):  Right 145 .  Left 145 .    STRENGTH:  Hip flexion:  Right 5/5.  Left 5/5.  Hip abduction:  Right 5/5.  Left 5/5.  Hip external rotation:  Right 5/5.  Left 5/5.  Hip extension:  Right 5/5.  Left 5/5.  Knee extension:  Right 5/5.  Left 5/5.  Knee flexion:  Right 5/5.  Left 5/5.  Ankle dorsiflexion:  Right 5/5.  Left 5/5.  Great toe dorsiflexion:  Right 5/5.  Left 5/5.  Ankle plantar flexion:  Right 5/5.  Left 5/5.    SENSATION:  Intact to light touch along right L2, L3, L4, L5, S1, S2.  Intact to light touch along left L2, L3, L4, L5, S1, S2.    REFLEXES:  Patellar (L2-L4):  Right 2+.  Left 2+.  Medial hamstrings (L5-S1):  Right 2+.  Left 2+.  Achilles (S1-S2):  Right 2+.  Left 2+.  Babinski:  Right downgoing.  Left downgoing.  Forced ankle dorsiflexion (clonus):  Right 0 beats.  Left 0 point.    LUMBOPELVIC SPECIAL TESTS:  Lizeth finger:  Right -.  Left -.  Gapping / Distraction:  Right -.  Left -.  Log roll:  Right -.  Left -.  Straight-leg raise (Lasegue):  Right -.  Left -.  Crossed-straight leg raise:  Right -.  Left -.  Resisted straight leg raise:   Right -.  Left -.  ZANEERE (Spencer):  Right -.  Left -.  FADIR:  Right -.  Left -.  Hip scour:  Right -.  Left -.  Lateral sacral compression:  Right -.  Left -.  Clamshell:  Right -.  Left -.  Femoral nerve stretch:  Right -.  Left -.  Crossed femoral nerve stretch:  Right -.  Left -.  Ely s:  Right -.  Left -.  Yeoman s:  Right -.  Left -.  Midline sacral thrust:  Right -.  Left -.  Noble compression:  Right -.  Left -.          IMAGING:  XR SACROILIAC JOINT G/E 3 VIEWS 7/31/2024 3:15 PM      HISTORY: Chronic bilateral low back pain with left-sided sciatica; Chronic bilateral low back pain with left-sided sciatica     COMPARISON: None.    IMPRESSION:     Normal joint spaces and alignment. No fracture. No erosions.          ASSESSMENT/PLAN:  Mr. Oniel Smart is a 34-year-old male.  He has low back pain that is currently worse on the left than on the right.  Mr. Oniel Smart has slight thoracic scoliosis convex left.  He does have both discogenic and facetogenic components to his low back pain.  Mr. Oniel Smart is noted to have an asymmetry in his pelvis as the range of motion between his hips is different.  Discussed diagnoses, pathophysiologies, further workup, and treatment options with Mr. Oniel Smart.  Discussed the option of doing nothing/living with it, physical therapy, imaging of the low back and imaging of the hips.  Mr. Oniel Smart is to get advanced imaging of his pelvis and lumbar spine.  He is also being sent for scoliosis series x-rays and flexion-extension x-rays of his lumbar spine.  Mr. Oniel Smart is follow-up in this clinic after his imaging is complete.        Total Time on encounter:  52 minutes were spent on one more or more of the following:  discussion with patient, history, exam, coordinating care, treatment goals, record review, documenting clinical information, and/or data review.      Alvin Huang MD        Again, thank you for allowing me to participate in the care of your  patient.      Sincerely,    Alvin Huang MD

## 2024-10-04 NOTE — PATIENT INSTRUCTIONS
For nursing questions, please call [phone].    Please schedule a follow-up appointment after your imaging.  You can schedule this at the , or you can call [phone].    For medical records, please call [phone].      Please schedule your imaging exams (x-rays, CT, MRI).  The [facility] Imaging Scheduling team will call you to schedule your imaging exam in the next 0-3 days.  You can also call them directly at [phone] to schedule your appointment.

## 2024-10-12 ENCOUNTER — HOSPITAL ENCOUNTER (OUTPATIENT)
Dept: GENERAL RADIOLOGY | Facility: CLINIC | Age: 34
Discharge: HOME | End: 2024-10-12
Attending: PHYSICAL MEDICINE & REHABILITATION
Payer: COMMERCIAL

## 2024-10-12 ENCOUNTER — HOSPITAL ENCOUNTER (OUTPATIENT)
Dept: CT IMAGING | Facility: CLINIC | Age: 34
Discharge: HOME | End: 2024-10-12
Attending: PHYSICAL MEDICINE & REHABILITATION
Payer: COMMERCIAL

## 2024-10-12 DIAGNOSIS — M47.817 FACET ARTHROPATHY, LUMBOSACRAL: ICD-10-CM

## 2024-10-12 DIAGNOSIS — M51.370 DEGENERATION OF INTERVERTEBRAL DISC OF LUMBOSACRAL REGION WITH DISCOGENIC BACK PAIN: ICD-10-CM

## 2024-10-12 DIAGNOSIS — R29.898 ASYMMETRIC HIPS: ICD-10-CM

## 2024-10-12 DIAGNOSIS — M41.9 SCOLIOSIS OF THORACIC SPINE, UNSPECIFIED SCOLIOSIS TYPE: ICD-10-CM

## 2024-10-12 PROCEDURE — 72082 X-RAY EXAM ENTIRE SPI 2/3 VW: CPT

## 2024-10-12 PROCEDURE — 72120 X-RAY BEND ONLY L-S SPINE: CPT

## 2024-10-12 PROCEDURE — 72192 CT PELVIS W/O DYE: CPT

## 2024-10-23 ENCOUNTER — HOSPITAL ENCOUNTER (OUTPATIENT)
Dept: MRI IMAGING | Facility: CLINIC | Age: 34
Discharge: HOME | End: 2024-10-23
Attending: PHYSICAL MEDICINE & REHABILITATION | Admitting: PHYSICAL MEDICINE & REHABILITATION
Payer: COMMERCIAL

## 2024-10-23 DIAGNOSIS — M47.817 FACET ARTHROPATHY, LUMBOSACRAL: ICD-10-CM

## 2024-10-23 DIAGNOSIS — R29.898 ASYMMETRIC HIPS: ICD-10-CM

## 2024-10-23 DIAGNOSIS — M51.370 DEGENERATION OF INTERVERTEBRAL DISC OF LUMBOSACRAL REGION WITH DISCOGENIC BACK PAIN: ICD-10-CM

## 2024-10-23 DIAGNOSIS — M41.9 SCOLIOSIS OF THORACIC SPINE, UNSPECIFIED SCOLIOSIS TYPE: ICD-10-CM

## 2024-10-23 PROCEDURE — 72148 MRI LUMBAR SPINE W/O DYE: CPT

## 2024-11-12 ENCOUNTER — OFFICE VISIT (OUTPATIENT)
Dept: ORTHOPEDICS | Facility: CLINIC | Age: 34
End: 2024-11-12
Payer: COMMERCIAL

## 2024-11-12 VITALS
WEIGHT: 136 LBS | HEIGHT: 67 IN | DIASTOLIC BLOOD PRESSURE: 80 MMHG | HEART RATE: 66 BPM | OXYGEN SATURATION: 100 % | BODY MASS INDEX: 21.35 KG/M2 | SYSTOLIC BLOOD PRESSURE: 146 MMHG

## 2024-11-12 DIAGNOSIS — M47.817 FACET ARTHROPATHY, LUMBOSACRAL: ICD-10-CM

## 2024-11-12 DIAGNOSIS — M51.370 DEGENERATION OF INTERVERTEBRAL DISC OF LUMBOSACRAL REGION WITH DISCOGENIC BACK PAIN: ICD-10-CM

## 2024-11-12 DIAGNOSIS — M54.17 LUMBOSACRAL RADICULOPATHY: Primary | ICD-10-CM

## 2024-11-12 DIAGNOSIS — M41.9 SCOLIOSIS OF THORACIC SPINE, UNSPECIFIED SCOLIOSIS TYPE: ICD-10-CM

## 2024-11-12 ASSESSMENT — PAIN SCALES - GENERAL: PAINLEVEL_OUTOF10: MILD PAIN (2)

## 2024-11-12 NOTE — PROGRESS NOTES
"PHYSICAL MEDICINE & REHABILITATION / MEDICAL SPINE        Date:  Nov 12, 2024    Name:  Oniel Smart  YOB: 1990  MRN:  [id number]          CHART REVIEW:  Reviewed 07/31/2024 note from Ramona Ann Aaseby-Aguilera, PA (family medicine).  Mr. Oniel Smart had bilateral low back pain, described as dull ache, gnawing, stabbing.  There was no radiating pain.  Pain was constant but did have a waxing and waning course.  Mr. Oniel Smart had chronic low back pain since Fall 2022.  At that time, he had some right sided sciatica.  Now, his pain was on the left side.  He had limited ability to stand.  Mr. Oniel Smart had physical therapy.  Referral to medical spine was placed.         CHIEF COMPLAINT:  ***        HISTORY OF PRESENT ILLNESS:  Mr. Oniel Smart is a 34-year-old, right-hand-dominant, right foot dominant, male.  He is .  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  Mr. Oniel Smart ran cross-country and track for the [employer].  Now, Mr. Oniel Smart does less running, but he plays tennis, golfs, lifts weights.     Mr. Oniel Smart stated in Fall 2022 he had episode of right-sided sciatica that improved with physical therapy.  Mr. Oniel Smart had a right great toe fracture in the past that was managed conservatively.  Mr. Oniel Smart denied any other injuries or surgeries of his mid back, low back, pelvis, hips, thighs, knees, legs, ankles, feet, toes.     Mr. Oniel Smart denied any personal or family history of autoimmune diseases, rheumatologic diseases, gout, pseudogout.  He denied any personal or family history of neurologic diseases.  Mr. Smart denied any personal or family history of inflammatory bowel diseases.     Mr. Oniel Smart began noting left low back/buttock pain in Fall 2023.      Currently, Mr. Oniel Smart has bilateral low back pain this worse on his left than his right.  This pain is constant and described as \"dull.\"  His " pain is without radiation.  Position changes and lying down seem to provide some alleviation.  Standing and sitting both worsen his low back pain, but standing causes more pain than sitting.             Mr. Oniel Smart currently rates his low back pain as 3/10.  His low back pain varies from 2/10 to 9/10.  Mr. Oniel Smart notes that a violent sneeze will worsen his low back pain.  Coughing does not change his low back pain.  Driving and hitting a pothole can worsen Mr. Oniel Smart's low back pain.  His low back pain does not keep him awake but can wake him.     Mr. Oniel Smart notes some weakness in his low back.  He denies any catching, locking, popping.  Mr. Oniel Smart denies any bruising, redness, swelling.  He denies any give way episodes of his lower extremities.  Mr. Oniel Smart denies any tripping, stumbling, falling.  He is not using any assistive devices for ambulation.  Mr. Oniel Smart denies any numbness, tingling, pins-and-needles.  He denies any saddle anesthesia, bowel incontinence, bladder incontinence.  Mr. Oniel Smart denies that his bilateral lower extremities become weak and tired with walking     Mr. Oniel Smart has not tried acupuncture, chiropractor treatments, injections, massage.  He really has not had much benefit with physical therapy.  He has also tried heat, ice, acetaminophen, and ibuprofen.         ***      Mr. Oniel Smart was last seen in this clinic on 10/04/2024.  He returns to clinic today, 11/12/2024.  He is accompanied to today's appointment by his wife, Ms. Sachi Smart.    Mr. Oniel Smart is really like to stay active.  He finds that his back pain limits his activities about 30 minutes.  He is really not noticing any radiation of his low back pain.  His low back pain is constant.  Low back pain is improved with acetaminophen.  Low back pain is currently rated as 2/10.  Pain varies from 1/10 to 9/10.  Pain is worse with prolonged sitting or prolonged  standing.  Low back pain is worsened with bending and twisting.  Heat and stretching of provided some relief.  Mr. Oniel Smart notes more pain with standing than sitting.  MrMan Smart has noted that his balance is not as good with his right lower extremity in comparison to his left lower extremity.            ***    REVIEW OF SYSTEMS:  ***        ALLERGIES:  Allergies   Allergen Reactions    Fructose Cramps and GI Disturbance    Sulfamethoxazole-Trimethoprim Hives and Rash         MEDICATIONS:  No current outpatient medications on file.         PAST MEDICAL HISTORY:  Past Medical History:   Diagnosis Date    Depressive disorder          PAST SURGICAL HISTORY:  Past Surgical History:   Procedure Laterality Date    COLONOSCOPY  2016         FAMILY HISTORY:  Family History   Problem Relation Age of Onset    Colon Cancer Father     Prostate Cancer Maternal Grandfather     Thyroid Disease Mother          SOCIAL HISTORY:  Social History     Socioeconomic History    Marital status:      Spouse name: Not on file    Number of children: Not on file    Years of education: Not on file    Highest education level: Not on file   Occupational History    Not on file   Tobacco Use    Smoking status: Never    Smokeless tobacco: Never   Substance and Sexual Activity    Alcohol use: Yes     Comment: socially    Drug use: Never    Sexual activity: Yes     Partners: Female     Birth control/protection: Condom   Other Topics Concern    Parent/sibling w/ CABG, MI or angioplasty before 65F 55M? No   Social History Narrative    Not on file     Social Drivers of Health     Financial Resource Strain: Low Risk  (7/29/2022)    Overall Financial Resource Strain (CARDIA)     Difficulty of Paying Living Expenses: Not hard at all   Food Insecurity: No Food Insecurity (7/29/2022)    Hunger Vital Sign     Worried About Running Out of Food in the Last Year: Never true     Ran Out of Food in the Last Year: Never true   Transportation  "Needs: No Transportation Needs (7/29/2022)    PRAPARE - Transportation     Lack of Transportation (Medical): No     Lack of Transportation (Non-Medical): No   Physical Activity: Sufficiently Active (7/29/2022)    Exercise Vital Sign     Days of Exercise per Week: 6 days     Minutes of Exercise per Session: 30 min   Stress: Stress Concern Present (7/29/2022)    [employer] [place] [employer] - Occupational Stress Questionnaire     Feeling of Stress : To some extent   Social Connections: Socially Integrated (7/29/2022)    Social Connection and Isolation Panel [NHANES]     Frequency of Communication with Friends and Family: Twice a week     Frequency of Social Gatherings with Friends and Family: Once a week     Attends Yazdanism Services: More than 4 times per year     Active Member of Clubs or Organizations: Yes     Attends Club or Organization Meetings: Not on file     Marital Status:    Interpersonal Safety: Low Risk  (7/31/2024)    Interpersonal Safety     Do you feel physically and emotionally safe where you currently live?: Yes     Within the past 12 months, have you been hit, slapped, kicked or otherwise physically hurt by someone?: No     Within the past 12 months, have you been humiliated or emotionally abused in other ways by your partner or ex-partner?: No   Housing Stability: Low Risk  (7/29/2022)    Housing Stability Vital Sign     Unable to Pay for Housing in the Last Year: No     Number of Places Lived in the Last Year: 1     Unstable Housing in the Last Year: No         PHYSICAL EXAMINATION:  Vitals:    11/12/24 1549   BP: (!) 146/80   Pulse: 66   SpO2: 100%   Weight: 61.7 kg (136 lb)   Height: 1.702 m (5' 7\")       GENERAL:  No acute distress.  Pleasant and cooperative.   PSYCH:  Normal mood and affect.  HEAD:  Normocephalic.  SPEECH:  No dysarthria.  EYES:  No scleral icterus.  EARS:  Hearing is intact to spoken voice.  NOSE:  Midline, symmetric, no rhinorrhea.  LUNGS:  No respiratory " distress.  No increased work of breathing.        IMAGING:  ***    EXAM: MR LUMBAR SPINE W/O CONTRAST  LOCATION: [facility]  DATE: 10/23/2024     INDICATION:  Scoliosis of thoracic spine, unspecified scoliosis type, Facet arthropathy, lumbosacral, Degeneration of intervertebral disc of lumbosacral region with discogenic back pain, Asymmetric hips  COMPARISON: Lumbar spine radiograph 10/12/2024.  TECHNIQUE: Routine Lumbar Spine MRI without IV contrast.     FINDINGS:   Nomenclature is based on 5 lumbar type vertebral bodies. Grade 1 anterolisthesis of L5 on S1 measuring approximately 6 mm, secondary to bilateral L5 pars defects. Schmorl's nodes within the adjacent endplates at each lumbar disc level, aside from L5-S1. Lumbar vertebral body heights are otherwise maintained. Mild Modic type I endplate changes anteriorly at L1-L2 and L5-S1. Normal distal spinal cord and cauda equina with conus medullaris at L1. No extraspinal abnormality. Unremarkable visualized bony pelvis.     T12-L1: Normal disc height and signal. Minimal disc bulge. Normal facets. No significant spinal canal or neural foraminal stenosis.      L1-L2: Mild loss of disc height and signal. Mild disc bulge. Normal facets. No significant spinal canal stenosis. No significant neural foraminal stenosis.     L2-L3: Normal disc height and signal. Minimal disc bulge. Normal facets. No significant spinal canal or neural foraminal stenosis.      L3-L4: Mild loss of disc height and signal. Mild disc bulge. Mild facet hypertrophy. No significant spinal canal stenosis. Mild bilateral neural foraminal stenosis.     L4-L5: Normal disc height and signal. Mild disc bulge with central annular fissure. Endplate osteophytic spurring. Mild bilateral facet hypertrophy. No significant spinal canal stenosis. Mild to moderate bilateral neural foraminal stenosis.     L5-S1: Mild loss of disc height and signal. Uncovering of the disc with broad disc bulge.  Mild bilateral facet hypertrophy. No significant spinal canal stenosis. Moderate to severe bilateral neural foraminal stenosis with compression of the exiting L5 nerve roots.    IMPRESSION:  1.  Grade 1 anterolisthesis of L5 on S1, secondary to bilateral L5 pars defects. This results in moderate to severe bilateral neural foraminal stenosis, with compression of the exiting L5 nerve roots.  2.  Mild to moderate bilateral neural foraminal stenosis at L4-L5.  3.  Additional mild multilevel lumbar spondylosis, as detailed above.        EXAM: CT PELVIS BONE WO CONTRAST  LOCATION: [facility]  DATE: 10/12/2024     INDICATION: asymmetry in hip range of motion  COMPARISON: CT 6/23/2016  TECHNIQUE: CT scan of the pelvis was performed without IV contrast. Multiplanar reformats were obtained. Dose reduction techniques were used.  CONTRAST: None.     FINDINGS:     No acute fracture or subluxation. Hip joint spaces are preserved. No large hip joint effusion. Mild degenerative change of the pubic symphysis. Sacroiliac joints are normal.     Bilateral L5 pars defects with grade 1 spondylolisthesis of L5 on S1. There is mild L5-S1 degenerative disc disease. There is incomplete fusion of the posterior elements of L5. The posterior elements of the sacrum are not fused.     No lymphadenopathy. No free intraperitoneal fluid.     Muscle bulk is normal and symmetric.    IMPRESSION:  1.  No acute fracture or subluxation. Hip joint spaces are preserved.  2.  Bilateral L5 pars defects with grade 1 spondylolisthesis of L5 on S1 and mild associated degenerative disc disease.        [facility]  XR SPINE COMPLETE SCOLIOSIS 2 VIEWS  10/12/2024 3:15 PM CDT     INDICATION:  Scoliosis of thoracic spine, unspecified scoliosis type, Facet arthropathy, lumbosacral, Degeneration of intervertebral disc of lumbosacral region with discogenic back pain, Asymmetric hips  TECHNIQUE: Standing AP and lateral views of  the spine.  COMPARISON: None.     FINDINGS:     Nomenclature is based on 12 thoracic and 5 lumbar-type vertebral bodies.     Sagittal balance is 2.9 cm negative.     Mild reversal of the normal cervical lordosis.     Thoracic kyphosis is difficult to accurately measure due to obscuration of the T1 segment in the lateral projection. However, it measures approximately 42 degrees between the T2 and T12 levels.     Lumbar lordosis measures approximately 44 degrees between the L1 and L5 levels.     Redemonstrated spondylolytic grade I anterolisthesis at L5-S1.     Coronal balance is neutral.     Levoconvex thoracic curvature of approximately 9 degrees between the T1 and T8 levels.     No significant lumbar scoliotic curvature.     The left iliac crest lies approximately 0.5 cm above the right.     Visualized lung fields are grossly well aerated.     Bowel gas pattern is nonspecific.        EXAM: XR LUMBAR FLEX/EXT 2/3 VIEWS  LOCATION: [facility]  DATE: 10/12/2024     INDICATION:  Scoliosis of thoracic spine, unspecified scoliosis type, Facet arthropathy, lumbosacral, Degeneration of intervertebral disc of lumbosacral region with discogenic back pain, Asymmetric hips  COMPARISON: None.    IMPRESSION:      Nomenclature is based on five lumbar-type vertebral bodies.     Vertebral body heights are unremarkable.     In the flexed position, L5-S1 anterolisthesis measures 9 mm.     In the extended position, L5-S1 anterolisthesis measures 8 mm.     Moderate L5-S1 interbody degenerative change.          ASSESSMENT/PLAN:  Mr. Oniel Smart is a 34-year-old male.  He has low back pain that is currently worse on the left than on the right.  Mr. Oniel Smart has slight thoracic scoliosis convex left.  He does have both discogenic and facetogenic components to his low back pain.  Mr. Oniel Smart is noted to have an asymmetry in his pelvis as the range of motion between his hips is different.  Discussed  diagnoses, pathophysiologies, further workup, and treatment options with Mr. Oniel Smart.  Discussed the option of doing nothing/living with it, physical therapy, imaging of the low back and imaging of the hips.  Mr. Oniel Smart is to get advanced imaging of his pelvis and lumbar spine.  He is also being sent for scoliosis series x-rays and flexion-extension x-rays of his lumbar spine.  Mr. Oniel Smart is follow-up in this clinic after his imaging is complete.     ***  Mr. Oniel Smart is a 34-year-old, right-hand-dominant, right foot dominant, male.  He has bilateral L5 pars defects with anterolisthesis of L5 on S1.  He has slight thoracic scoliosis convex left with trunk shift slightly to the left.  He has discogenic and facet agenic complaints to his low back pain.  There is concern for right lumbosacral radiculopathy (L5).  Discussed diagnoses, pathophysiologies, and treatment options with Mr. Oniel Smart.  Discussed the options of doing nothing/living with it, physical therapy, chiropractic care, core strengthening, lumbosacral epidural corticosteroid injection, lumbosacral facet joint medial branch blocks with following radiofrequency ablations, referral to neurosurgery.  Mr. Oniel Smart will be set for right paramedian L5-S1 interlaminar epidural corticosteroid injection.  He is agreement physical therapy 2 weeks after the injection to work on high-level balance activities.  Mr. Oniel Smart is follow-up in this clinic in 2 months.  ***    Total Time on encounter:  *** minutes were spent on one more or more of the following:  discussion with patient, history, exam, coordinating care, treatment goals, record review, documenting clinical information, and/or data review.      Alvin Huang MD

## 2024-11-12 NOTE — PATIENT INSTRUCTIONS
For nursing questions, please call [phone].    Please schedule a follow-up appointment in 2 months.  You can schedule this at the , or you can call [phone].    For medical records, please call [phone].    Please schedule fluoroscopic-guided lumbosacral epidural steroid injection with me.  The [facility] Procedure Scheduling Team will call you to schedule your procedure in the next 0-3 days.  You can also call them directly at [phone] option 2.    Please schedule an appointment with Physical Therapy.

## 2024-12-01 ENCOUNTER — HEALTH MAINTENANCE LETTER (OUTPATIENT)
Age: 34
End: 2024-12-01

## 2024-12-16 ENCOUNTER — RADIOLOGY INJECTION OFFICE VISIT (OUTPATIENT)
Dept: PALLIATIVE MEDICINE | Facility: CLINIC | Age: 34
End: 2024-12-16
Attending: PHYSICAL MEDICINE & REHABILITATION
Payer: COMMERCIAL

## 2024-12-16 VITALS — HEART RATE: 63 BPM | DIASTOLIC BLOOD PRESSURE: 80 MMHG | OXYGEN SATURATION: 98 % | SYSTOLIC BLOOD PRESSURE: 143 MMHG

## 2024-12-16 DIAGNOSIS — M41.9 SCOLIOSIS OF THORACIC SPINE, UNSPECIFIED SCOLIOSIS TYPE: ICD-10-CM

## 2024-12-16 DIAGNOSIS — M54.16 LUMBAR RADICULOPATHY: ICD-10-CM

## 2024-12-16 DIAGNOSIS — M51.370 DEGENERATION OF INTERVERTEBRAL DISC OF LUMBOSACRAL REGION WITH DISCOGENIC BACK PAIN: ICD-10-CM

## 2024-12-16 DIAGNOSIS — M54.17 LUMBOSACRAL RADICULOPATHY: ICD-10-CM

## 2024-12-16 DIAGNOSIS — M47.817 FACET ARTHROPATHY, LUMBOSACRAL: ICD-10-CM

## 2024-12-16 PROCEDURE — 62323 NJX INTERLAMINAR LMBR/SAC: CPT | Performed by: PHYSICAL MEDICINE & REHABILITATION

## 2024-12-16 RX ORDER — LIDOCAINE HYDROCHLORIDE 10 MG/ML
1.5 INJECTION, SOLUTION EPIDURAL; INFILTRATION; INTRACAUDAL; PERINEURAL ONCE
Status: COMPLETED | OUTPATIENT
Start: 2024-12-16 | End: 2024-12-16

## 2024-12-16 RX ORDER — DEXAMETHASONE SODIUM PHOSPHATE 10 MG/ML
10 INJECTION, SOLUTION INTRAMUSCULAR; INTRAVENOUS ONCE
Status: COMPLETED | OUTPATIENT
Start: 2024-12-16 | End: 2024-12-16

## 2024-12-16 RX ADMIN — DEXAMETHASONE SODIUM PHOSPHATE 10 MG: 10 INJECTION, SOLUTION INTRAMUSCULAR; INTRAVENOUS at 11:05

## 2024-12-16 RX ADMIN — LIDOCAINE HYDROCHLORIDE 1.5 ML: 10 INJECTION, SOLUTION EPIDURAL; INFILTRATION; INTRACAUDAL; PERINEURAL at 11:04

## 2024-12-16 ASSESSMENT — PAIN SCALES - GENERAL: PAINLEVEL_OUTOF10: MILD PAIN (3)

## 2024-12-16 NOTE — PROGRESS NOTES
"PHYSICAL MEDICINE & REHABILITATION / MEDICAL SPINE      PROCEDURE DATE:  Dec 16, 2024      PATIENT NAME:  Oniel Smart  MRN:  [id number]  YOB: 1990      PRE-PROCEDURE DIAGNOSIS:  1. Lumbosacral radiculopathy    2. Degeneration of intervertebral disc of lumbosacral region with discogenic back pain    3. Facet arthropathy, lumbosacral    4. Scoliosis of thoracic spine, unspecified scoliosis type    5. Lumbar radiculopathy        POST-PROCEDURE DIAGNOSIS:  1. Lumbosacral radiculopathy    2. Degeneration of intervertebral disc of lumbosacral region with discogenic back pain    3. Facet arthropathy, lumbosacral    4. Scoliosis of thoracic spine, unspecified scoliosis type    5. Lumbar radiculopathy        PROCEDURE:  Fluoroscopic-guided right paramedian L5-S1 interlaminar epidural steroid injection.  (CPT code:  73644)      PROCEDURE IN DETAIL:  Prior to the procedure, educational material was provided for the patient to review and take home.  After a discussion of the risks, benefits, and alternatives to the procedure, the patient expressed understanding and wished to proceed.  Consent form was signed.  The patient was brought to the fluoroscopy suite and placed in the prone position.  Procedural pause was conducted to verify:  patient identity, procedure to be performed, laterality, site, and patient position.    The skin was sterilely prepped using chlorhexidine and allowed to dry.  The skin was draped in the usual sterile fashion.  After identifying the L5-S1 interspace with fluoroscopy, the skin and subcutaneous tissue were infiltrated with 1 ml 1% preservative-free lidocaine.  Utilizing a loss of resistance technique and intermittent fluoroscopic guidance, 20g 3.5\" Tuohy needle was advanced into the epidural space using a right paramedian approach.  Proper needle positioning was confirmed using multiple fluoroscopic views.  The epidural space was accessed at a depth of 3.6 cm.  After negative " aspiration, 1 ml Omnipaque 300 (iohexol) was injected confirming epidural spread without evidence of intravascular or intrathecal spread.  Next, 10 mg dexamethasone and 4 ml preservative-free normal saline were injected.  Following the injection, the needle was withdrawn slightly and flushed with 0.5 ml preservative-free 1% lidocaine as it was fully extracted.    The patient tolerated the procedure well, and there were no apparent complications.  The patient was escorted back to the postprocedure room.  The patient was monitored for side effects.  No reactions were noted.  After appropriate observation, the patient was dismissed from the clinic in good condition.    Preprocedure pain level: 3/10.  Postprocedure pain level: 4/10.      Alvin Huang MD

## 2024-12-16 NOTE — NURSING NOTE
Discharge Information    IV Discontiued Time:  NA    Amount of Fluid Infused:  NA    Discharge Criteria = When patient returns to baseline or as per MD order    Consciousness:  Pt is fully awake    Circulation:  BP +/- 20% of pre-procedure level    Respiration:  Patient is able to breathe deeply    O2 Sat:  Patient is able to maintain O2 Sat >92% on room air    Activity:  Moves 4 extremities on command    Ambulation:  Patient is able to stand and walk or stand and pivot into wheelchair    Dressing:  Clean/dry or No Dressing    Notes:   Discharge instructions and AVS given to patient    Patient meets criteria for discharge?  YES    Admitted to PCU?  No    Responsible adult present to accompany patient home?  Yes    Signature/Title:    Madison Mcneal RN  RN Care Coordinator  [facility]

## 2024-12-16 NOTE — PATIENT INSTRUCTIONS
[facility] Procedure Discharge Instructions    Today you saw:   Dr. Chad Huang    Your procedure:  Epidural steroid injection       Medications used:  Lidocaine (anesthetic)  Dexamethasone (steroid)  Omnipaque (contrast)            Be cautious when walking as numbness and/or weakness in the legs may occur up to 6-8 hours after the procedure due to effect of the local anesthetic  Do not drive for 6 hours. The effect of the local anesthetic could slow your reflexes.   Avoid strenuous activity for the first 24 hours. You may resume your regular activities after that.   You may shower, however avoid swimming, tub baths or hot tubs for 24 hours following your procedure  You may have a mild to moderate increase in pain for several days following the injection.    You may use ice packs for 10-15 minutes, 3 to 4 times a day at the injection site for comfort  Do not use heat to painful areas for 6 to 8 hours. This will give the local anesthetic time to wear off and prevent you from accidentally burning your skin.  Unless you have been directed to avoid the use of anti-inflammatory medications (NSAIDS-ibuprofen, Aleve, Motrin), you may use these medications or Tylenol for pain control if needed.   With diabetes, check your blood sugar more frequently than usual as your blood sugar may be higher than normal for 10-14 days following a steroid injection. Contact your doctor who manages your diabetes if your blood sugar is higher than usual  Possible side effects of steroids that you may experience include flushing, elevated blood pressure, increased appetite, mild headaches and restlessness.  All of these symptoms will get better with time.  It may take up to 14 days for the steroid medication to start working although you may feel the effect as early as a few days after the procedure.   Follow up with your referring provider in 2-3 weeks    If you experience any of the following, call the Spine Center line  during work hours at [phone]:  -Fever over 100 degree F  -Swelling, bleeding, redness, drainage, warmth at the injection site  -Progressive weakness or numbness in your legs  -Loss of bowel or bladder function  -Unusual headache that is not relieved by Tylenol or your regular headache medication  -Unusual new onset of pain that is not improving

## 2024-12-16 NOTE — NURSING NOTE
Pre-procedure Intake  If YES to any questions or NO to having a   Please complete laminated checklist and leave on the computer keyboard for Provider, verbally inform provider if able.    For SCS Trial, RFA's or any sedation procedure:  Have you been fasting? NA  If yes, for how long?     Are you taking any any blood thinners such as Coumadin, Warfarin, Jantoven, Pradaxa Xarelto, Eliquis, Edoxaban, Enoxaparin, Lovenox, Heparin, Arixtra, Fondaparinux, or Fragmin? OR Antiplatelet medication such as Plavix, Brilinta, or Effient?   No   If yes, when did you take your last dose?     Do you take aspirin?  No  If cervical procedure, have you held aspirin for 6 days?   NA    Is the Pt taking any GLP-1 Antagonist (hold needed for sedation patients only)  (semaglutide (Ozempic, Wegovy), dulaglutide (Trulicity), exenatide ER (Bydureon), tirzepatide (Mounjaro), Liraglutide (Saxenda, Victoza), semaglutide (Rybelsus)     NA  If yes, when did you take your last dose?     Do you have any allergies to contrast dye, iodine, steroid and/or numbing medications?  NO    Are you currently taking antibiotics or have an active infection?  NO    Have you had a fever/elevated temperature within the past week? NO    Are you currently taking oral steroids? NO    Do you have a ? Yes    Are you pregnant or breastfeeding?  Not Applicable    Have you received any vaccinations in the last week? NO    Notify provider and RNs if systolic BP >170, diastolic BP >100, P >100 or O2 sats < 90%

## 2024-12-31 ENCOUNTER — THERAPY VISIT (OUTPATIENT)
Dept: PHYSICAL THERAPY | Facility: CLINIC | Age: 34
End: 2024-12-31
Attending: PHYSICAL MEDICINE & REHABILITATION
Payer: COMMERCIAL

## 2024-12-31 DIAGNOSIS — M51.370 DEGENERATION OF INTERVERTEBRAL DISC OF LUMBOSACRAL REGION WITH DISCOGENIC BACK PAIN: ICD-10-CM

## 2024-12-31 DIAGNOSIS — M47.817 FACET ARTHROPATHY, LUMBOSACRAL: ICD-10-CM

## 2024-12-31 DIAGNOSIS — M54.17 LUMBOSACRAL RADICULOPATHY: ICD-10-CM

## 2024-12-31 DIAGNOSIS — M41.9 SCOLIOSIS OF THORACIC SPINE, UNSPECIFIED SCOLIOSIS TYPE: ICD-10-CM

## 2024-12-31 PROCEDURE — 97110 THERAPEUTIC EXERCISES: CPT | Mod: GP | Performed by: PHYSICAL THERAPIST

## 2024-12-31 PROCEDURE — 97161 PT EVAL LOW COMPLEX 20 MIN: CPT | Mod: GP | Performed by: PHYSICAL THERAPIST

## 2024-12-31 NOTE — PROGRESS NOTES
PHYSICAL THERAPY EVALUATION  Type of Visit: Evaluation       Fall Risk Screen:  Fall screen completed by: PT  Have you fallen 2 or more times in the past year?: No  Have you fallen and had an injury in the past year?: No  Is patient a fall risk?: No    Subjective       Presenting condition or subjective complaint:  Patient presents with a 10 yr hx of Low Back Pain. He has previously completed PT in the past for similar sx. 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 He is frustrated by his symptoms and how his activity is significantly limited. Patient had an L5-S1 injection on 11/13/24. States he had a few days of relief but that it was quite temporary.   Currently patient tolerates some walking, jogging, and yoga if he limits it to 30 minutes max every other day. States he has been managing his back pain with some stretching from previous PT and by using rest breaks/heat pack. Patient reports that he feels the best in the middle of the afternoon and feels stiff in the morning. Notes that overall sleeping is tolerable on R side or sometimes his back.   Date of onset: 12/31/24    Relevant medical history:   Scoliosis of thoracic spine  Past Medical History:   Diagnosis Date    Depressive disorder    Dates & types of surgery:  none applicable  Prior diagnostic imaging/testing results: (Patient-Rptd) MRI; CT scan; X-ray     Prior therapy history for the same diagnosis, illness or injury: (Patient-Rptd) Yes (Patient-Rptd) Physical therapy in February 2023    Prior Level of Function: Independent    Living Environment  Social support: (Patient-Rptd) With a significant other or spouse   Type of home: (Patient-Rptd) House   Stairs to enter the home: (Patient-Rptd) Yes   Is there a railing: (Patient-Rptd) Yes     Ramp: (Patient-Rptd) No   Stairs inside the home: (Patient-Rptd) Yes   Is there a railing: (Patient-Rptd) Yes     Help at home: (Patient-Rptd)  None  Equipment owned:       Employment: (Patient-Rptd) Yes (Patient-Rptd) - 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 Play tennis multiple times per week.   Pain assessment: Pain present- central low back   Aggravating movements/positioning: Forward bending and twisting; prolonged positioning then everything tightness; mostly central discomfort.      Objective   LUMBAR SPINE:     Standing Posture: WFL, mild forward head position     Gait: WFL when walking; mild drop hip on R side tension on R side     Function Screen:   -Sit to stand: tolerates  -Squat: minor discomfort reported at end range  -SL Squat: not completed  -Bridge: increased aggravation of posterior spine symptoms      AROM (* Denotes Pain):     L  R   Flexion Min limitations but pain at end range   Extension Moderate to significant limitations with pain through motion especially as motion progresses   Side Glide Increased discomfort mild=mod limitations Mild limitations no pain reported   Rotation Rotation more tolerable to R  side compared to L side      Myotomes: WNL  Dermatomes: WNL    Palpation: no significant TTP noted around hip; TTP along lumbar paraspinals and sacral boarder, also TTP at PSIS        Special tests:    L R   FADIR + +   KOREY +    Scour - -                        Long Axis Traction       Slump  +  -    SLR  + +      Hip Screen: notable difference in apparent leg length by 1.5 cm initially L LE longer than R LE; after completion of alternating isometric hip flexion LE appear level- patient reports that R side tends to be tighter through pelvis compared to L side      Assessment & Plan   CLINICAL IMPRESSIONS  Medical Diagnosis: Lumbosacral radiculopathy  Degeneration of  intervertebral disc of lumbosacral region with discogenic back pain  Facet arthropathy, lumbosacral  Scoliosis of thoracic spine, unspecified scoliosis type    Treatment Diagnosis: LBP with mobility deficits   Impression/Assessment: Patient is a 34 year old male with low back pain and limited physical/recreational activity complaints.  The following significant findings have been identified: Pain, Decreased ROM/flexibility, Decreased joint mobility, Decreased strength, Impaired balance, Impaired gait, Impaired muscle performance, Decreased activity tolerance, and Impaired posture. These impairments interfere with their ability to perform self care tasks, work tasks, recreational activities, household chores, driving , household mobility, community mobility, and physical activity  as compared to previous level of function.     Clinical Decision Making (Complexity):  Clinical Presentation: Stable/Uncomplicated  Clinical Presentation Rationale: based on medical and personal factors listed in PT evaluation  Clinical Decision Making (Complexity): Low complexity    PLAN OF CARE  Treatment Interventions:  Interventions: Gait Training, Manual Therapy, Neuromuscular Re-education, Therapeutic Activity, Therapeutic Exercise, Self-Care/Home Management    Long Term Goals     PT Goal 1  Goal Identifier: HEP  Goal Description: Patient will be consistent and independent with HEP in order to achieve functional goals.  Rationale: to maximize safety and independence with performance of ADLs and functional tasks;to maximize safety and independence within the home;to maximize safety and independence within the community;to maximize safety and independence with self cares  Target Date: 01/28/25  PT Goal 2  Goal Identifier: Posture  Goal Description: Patient will demonstrate activity modification and posture modifications to tolerate morning and work times better without stiffening of symptoms in reaction to activity or prolonged  positions.  Rationale: to maximize safety and independence with performance of ADLs and functional tasks;to maximize safety and independence with self cares  Target Date: 02/25/25  PT Goal 3  Goal Identifier: Physical/Recreational Activites  Goal Description: Patient will toelrate at least 1 hour of physical activity of his choice at least 4x per week without increased tension and symptoms the following day.  Rationale: to maximize safety and independence with performance of ADLs and functional tasks;to maximize safety and independence within the home;to maximize safety and independence within the community;to maximize safety and independence with transportation;to maximize safety and independence with self cares  Target Date: 03/11/25      Frequency of Treatment: 1x per 1-2 week(s)  Duration of Treatment: 10 weeks    Recommended Referrals to Other Professionals:   Education Assessment:   Learner/Method: Patient;No Barriers to Learning  Education Comments: established HEP    Risks and benefits of evaluation/treatment have been explained.   Patient/Family/caregiver agrees with Plan of Care.     Evaluation Time:     PT Eval, Low Complexity Minutes (73592): 22     Signing Clinician: Karen Lopez PT

## 2025-01-14 ENCOUNTER — OFFICE VISIT (OUTPATIENT)
Dept: ORTHOPEDICS | Facility: CLINIC | Age: 35
End: 2025-01-14
Payer: COMMERCIAL

## 2025-01-14 VITALS
HEIGHT: 67 IN | WEIGHT: 130 LBS | HEART RATE: 61 BPM | SYSTOLIC BLOOD PRESSURE: 132 MMHG | OXYGEN SATURATION: 100 % | BODY MASS INDEX: 20.4 KG/M2 | DIASTOLIC BLOOD PRESSURE: 88 MMHG

## 2025-01-14 DIAGNOSIS — M47.817 FACET ARTHROPATHY, LUMBOSACRAL: Primary | ICD-10-CM

## 2025-01-14 DIAGNOSIS — M43.17 SPONDYLOLISTHESIS AT L5-S1 LEVEL: ICD-10-CM

## 2025-01-14 DIAGNOSIS — M51.370 DEGENERATION OF INTERVERTEBRAL DISC OF LUMBOSACRAL REGION WITH DISCOGENIC BACK PAIN: ICD-10-CM

## 2025-01-14 DIAGNOSIS — M54.17 LUMBOSACRAL RADICULOPATHY: ICD-10-CM

## 2025-01-14 DIAGNOSIS — M41.9 SCOLIOSIS OF THORACIC SPINE, UNSPECIFIED SCOLIOSIS TYPE: ICD-10-CM

## 2025-01-14 ASSESSMENT — PAIN SCALES - GENERAL: PAINLEVEL_OUTOF10: MODERATE PAIN (5)

## 2025-01-14 NOTE — PATIENT INSTRUCTIONS
For nursing questions, please call [phone].    Please schedule a follow-up appointment in 4-8 weeks.  You can schedule this at the , or you can call [phone].    For medical records, please call [phone].         General

## 2025-01-14 NOTE — PROGRESS NOTES
PHYSICAL MEDICINE & REHABILITATION / MEDICAL SPINE        Date:  Jan 14, 2025    Name:  Oniel Smart  YOB: 1990  MRN:  [id number]          CHART REVIEW:  Reviewed 07/31/2024 note from Ramona Ann Aaseby-Aguilera, PA (family medicine).  Mr. Oniel Smart had bilateral low back pain, described as dull ache, gnawing, stabbing.  There was no radiating pain.  Pain was constant but did have a waxing and waning course.  Mr. Oniel Smart had chronic low back pain since Fall 2022.  At that time, he had some right sided sciatica.  Now, his pain was on the left side.  He had limited ability to stand.  Mr. Oniel Smart had physical therapy.  Referral to medical spine was placed.         CHIEF COMPLAINT:  Low back pain.        HISTORY OF PRESENT ILLNESS:  Mr. Oniel Smart is a 34-year-old, right-hand-dominant, right foot dominant, male.  He is .  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, and he does audits.  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  Now, Mr. Oniel Smart does less running, but he plays tennis, golfs, and lifts weights.     Mr. Oniel Smart stated in Fall 2022 he had episode of right-sided sciatica that improved with physical therapy.  Mr. Oniel Smart had a right great toe fracture in the past that was managed conservatively.  Mr. Oniel Smart denied any other injuries or surgeries of his mid back, low back, pelvis, hips, thighs, knees, legs, ankles, feet, toes.     Mr. Oniel Smart denied any personal or family history of autoimmune diseases, rheumatologic diseases, gout, pseudogout.  He denied any personal or family history of neurologic diseases.  Mr. Smart denied any personal or family history of inflammatory bowel diseases.     Mr. Oniel Smart began noting left low back/buttock pain in Fall 2023.     Mr. Oniel Smart was last seen in this clinic on 11/12/2024.  On 12/16/2024, Mr. Oniel Smart had a right paramedian L5-S1  interlaminar epidural corticosteroid injection.  Mr. Oniel Smart returns to clinic today, 01/14/2025.    Mr. Oniel Smart states that he noted maybe 48 to 72 hours of pain relief from the injection.  He notes that now it might take a little bit longer before he develops low back pain with his activity.  Mr. Oniel Smart did have episode of one day of tingling in his right lower extremity that resolved; this has not recurred.    Mr. Oniel Smart notes nearly constant aching pain in his bilateral low back that is typically equal between his right and left sides.  This pain is worsened with bending, rotating, prolonged sitting, prolonged standing.  Changing positions seems to provide relief.  Heat and rest also provide some relief.    Mr. Oniel Smart's pain does not keep him awake nor wake him.  He is not taking any pain medications.  Mr. Oniel Smart currently rates his low back pain as 5/10.        ALLERGIES:  Allergies   Allergen Reactions    Fructose Cramps and GI Disturbance    Sulfamethoxazole-Trimethoprim Hives and Rash         MEDICATIONS:  No current outpatient medications on file.         PAST MEDICAL HISTORY:  Past Medical History:   Diagnosis Date    Depressive disorder          PAST SURGICAL HISTORY:  Past Surgical History:   Procedure Laterality Date    COLONOSCOPY  2016         FAMILY HISTORY:  Family History   Problem Relation Age of Onset    Colon Cancer Father     Prostate Cancer Maternal Grandfather     Thyroid Disease Mother          SOCIAL HISTORY:  Social History     Socioeconomic History    Marital status:      Spouse name: Not on file    Number of children: Not on file    Years of education: Not on file    Highest education level: Not on file   Occupational History    Not on file   Tobacco Use    Smoking status: Never    Smokeless tobacco: Never   Substance and Sexual Activity    Alcohol use: Yes     Comment: socially    Drug use: Never    Sexual activity: Yes     Partners: Female      Birth control/protection: Condom   Other Topics Concern    Parent/sibling w/ CABG, MI or angioplasty before 65F 55M? No   Social History Narrative    Not on file     Social Drivers of Health     Financial Resource Strain: Low Risk  (7/29/2022)    Overall Financial Resource Strain (CARDIA)     Difficulty of Paying Living Expenses: Not hard at all   Food Insecurity: No Food Insecurity (7/29/2022)    Hunger Vital Sign     Worried About Running Out of Food in the Last Year: Never true     Ran Out of Food in the Last Year: Never true   Transportation Needs: No Transportation Needs (7/29/2022)    PRAPARE - Transportation     Lack of Transportation (Medical): No     Lack of Transportation (Non-Medical): No   Physical Activity: Sufficiently Active (7/29/2022)    Exercise Vital Sign     Days of Exercise per Week: 6 days     Minutes of Exercise per Session: 30 min   Stress: Stress Concern Present (7/29/2022)    [employer] - Occupational Stress Questionnaire     Feeling of Stress : To some extent   Social Connections: Socially Integrated (7/29/2022)    Social Connection and Isolation Panel [NHANES]     Frequency of Communication with Friends and Family: Twice a week     Frequency of Social Gatherings with Friends and Family: Once a week     Attends Sabianist Services: More than 4 times per year     Active Member of Clubs or Organizations: Yes     Attends Club or Organization Meetings: Not on file     Marital Status:    Interpersonal Safety: Low Risk  (7/31/2024)    Interpersonal Safety     Do you feel physically and emotionally safe where you currently live?: Yes     Within the past 12 months, have you been hit, slapped, kicked or otherwise physically hurt by someone?: No     Within the past 12 months, have you been humiliated or emotionally abused in other ways by your partner or ex-partner?: No   Housing Stability: Low Risk  (7/29/2022)    Housing Stability Vital Sign     Unable to  "Pay for Housing in the Last Year: No     Number of Places Lived in the Last Year: 1     Unstable Housing in the Last Year: No         PHYSICAL EXAMINATION:  Vitals:    01/14/25 0916   BP: 132/88   Pulse: 61   SpO2: 100%   Weight: 59 kg (130 lb)   Height: 1.702 m (5' 7\")       GENERAL:  No acute distress.  Pleasant and cooperative.   PSYCH:  Normal mood and affect.  HEAD:  Normocephalic.  SPEECH:  No dysarthria.  EYES:  No scleral icterus.  Wearing glasses.  EARS:  Hearing is intact to spoken voice.  NOSE:  Midline, symmetric, no rhinorrhea.  LUNGS:  No respiratory distress.  No increased work of breathing.        IMAGING:  EXAM: MR LUMBAR SPINE W/O CONTRAST  LOCATION: [facility]  DATE: 10/23/2024     INDICATION:  Scoliosis of thoracic spine, unspecified scoliosis type, Facet arthropathy, lumbosacral, Degeneration of intervertebral disc of lumbosacral region with discogenic back pain, Asymmetric hips  COMPARISON: Lumbar spine radiograph 10/12/2024.  TECHNIQUE: Routine Lumbar Spine MRI without IV contrast.     FINDINGS:   Nomenclature is based on 5 lumbar type vertebral bodies. Grade 1 anterolisthesis of L5 on S1 measuring approximately 6 mm, secondary to bilateral L5 pars defects. Schmorl's nodes within the adjacent endplates at each lumbar disc level, aside from L5-S1. Lumbar vertebral body heights are otherwise maintained. Mild Modic type I endplate changes anteriorly at L1-L2 and L5-S1. Normal distal spinal cord and cauda equina with conus medullaris at L1. No extraspinal abnormality. Unremarkable visualized bony pelvis.     T12-L1: Normal disc height and signal. Minimal disc bulge. Normal facets. No significant spinal canal or neural foraminal stenosis.      L1-L2: Mild loss of disc height and signal. Mild disc bulge. Normal facets. No significant spinal canal stenosis. No significant neural foraminal stenosis.     L2-L3: Normal disc height and signal. Minimal disc bulge. Normal facets. No " significant spinal canal or neural foraminal stenosis.      L3-L4: Mild loss of disc height and signal. Mild disc bulge. Mild facet hypertrophy. No significant spinal canal stenosis. Mild bilateral neural foraminal stenosis.     L4-L5: Normal disc height and signal. Mild disc bulge with central annular fissure. Endplate osteophytic spurring. Mild bilateral facet hypertrophy. No significant spinal canal stenosis. Mild to moderate bilateral neural foraminal stenosis.     L5-S1: Mild loss of disc height and signal. Uncovering of the disc with broad disc bulge. Mild bilateral facet hypertrophy. No significant spinal canal stenosis. Moderate to severe bilateral neural foraminal stenosis with compression of the exiting L5 nerve roots.     IMPRESSION:  1.  Grade 1 anterolisthesis of L5 on S1, secondary to bilateral L5 pars defects. This results in moderate to severe bilateral neural foraminal stenosis, with compression of the exiting L5 nerve roots.  2.  Mild to moderate bilateral neural foraminal stenosis at L4-L5.  3.  Additional mild multilevel lumbar spondylosis, as detailed above.           EXAM: CT PELVIS BONE WO CONTRAST  LOCATION: [facility]  DATE: 10/12/2024     INDICATION: asymmetry in hip range of motion  COMPARISON: CT 6/23/2016  TECHNIQUE: CT scan of the pelvis was performed without IV contrast. Multiplanar reformats were obtained. Dose reduction techniques were used.  CONTRAST: None.     FINDINGS:     No acute fracture or subluxation. Hip joint spaces are preserved. No large hip joint effusion. Mild degenerative change of the pubic symphysis. Sacroiliac joints are normal.     Bilateral L5 pars defects with grade 1 spondylolisthesis of L5 on S1. There is mild L5-S1 degenerative disc disease. There is incomplete fusion of the posterior elements of L5. The posterior elements of the sacrum are not fused.     No lymphadenopathy. No free intraperitoneal fluid.     Muscle bulk is normal and  symmetric.     IMPRESSION:  1.  No acute fracture or subluxation. Hip joint spaces are preserved.  2.  Bilateral L5 pars defects with grade 1 spondylolisthesis of L5 on S1 and mild associated degenerative disc disease.           [facility]  XR SPINE COMPLETE SCOLIOSIS 2 VIEWS  10/12/2024 3:15 PM CDT     INDICATION:  Scoliosis of thoracic spine, unspecified scoliosis type, Facet arthropathy, lumbosacral, Degeneration of intervertebral disc of lumbosacral region with discogenic back pain, Asymmetric hips  TECHNIQUE: Standing AP and lateral views of the spine.  COMPARISON: None.     FINDINGS:     Nomenclature is based on 12 thoracic and 5 lumbar-type vertebral bodies.     Sagittal balance is 2.9 cm negative.     Mild reversal of the normal cervical lordosis.     Thoracic kyphosis is difficult to accurately measure due to obscuration of the T1 segment in the lateral projection. However, it measures approximately 42 degrees between the T2 and T12 levels.     Lumbar lordosis measures approximately 44 degrees between the L1 and L5 levels.     Redemonstrated spondylolytic grade I anterolisthesis at L5-S1.     Coronal balance is neutral.     Levoconvex thoracic curvature of approximately 9 degrees between the T1 and T8 levels.     No significant lumbar scoliotic curvature.     The left iliac crest lies approximately 0.5 cm above the right.     Visualized lung fields are grossly well aerated.     Bowel gas pattern is nonspecific.           EXAM: XR LUMBAR FLEX/EXT 2/3 VIEWS  LOCATION: [facility]  DATE: 10/12/2024     INDICATION:  Scoliosis of thoracic spine, unspecified scoliosis type, Facet arthropathy, lumbosacral, Degeneration of intervertebral disc of lumbosacral region with discogenic back pain, Asymmetric hips  COMPARISON: None.     IMPRESSION:      Nomenclature is based on five lumbar-type vertebral bodies.     Vertebral body heights are unremarkable.     In the flexed  position, L5-S1 anterolisthesis measures 9 mm.     In the extended position, L5-S1 anterolisthesis measures 8 mm.     Moderate L5-S1 interbody degenerative change.          ASSESSMENT/PLAN:  Mr. Oniel Smart is a 34-year-old, right-hand-dominant, right foot dominant, male.  He has bilateral L5 pars defects with anterolisthesis of L5 on S1.  He has slight thoracic scoliosis convex left with a trunk shift slightly to the left.  His pain seems most consistent with lumbosacral facet arthropathy.  Discussed diagnosis, pathophysiology, and treatment options with Mr. Oniel Smart.  Discussed the options of doing nothing/living with it, physical therapy, chiropractic care, core strengthening, repeat lumbosacral epidural corticosteroid injection, lumbosacral facet joint medial branch blocks with following radiofrequency ablations, referral to neurosurgery.  Mr. Oniel Smart would like to consider his options.  He is to follow-up in this clinic in a few weeks.  He will contact this clinic earlier if he decides to proceed with any interventions.        Total Time on encounter:  43 minutes were spent on one more or more of the following:  discussion with patient, history, exam, coordinating care, treatment goals, record review, documenting clinical information, and/or data review.      Alvin Huang MD

## 2025-01-31 ENCOUNTER — THERAPY VISIT (OUTPATIENT)
Dept: PHYSICAL THERAPY | Facility: CLINIC | Age: 35
End: 2025-01-31
Payer: COMMERCIAL

## 2025-01-31 DIAGNOSIS — M41.9 SCOLIOSIS OF THORACIC SPINE, UNSPECIFIED SCOLIOSIS TYPE: Primary | ICD-10-CM

## 2025-01-31 DIAGNOSIS — G89.29 CHRONIC BILATERAL LOW BACK PAIN WITHOUT SCIATICA: ICD-10-CM

## 2025-01-31 DIAGNOSIS — M54.50 CHRONIC BILATERAL LOW BACK PAIN WITHOUT SCIATICA: ICD-10-CM

## 2025-01-31 PROCEDURE — 97110 THERAPEUTIC EXERCISES: CPT | Mod: GP | Performed by: PHYSICAL THERAPIST

## 2025-02-10 ENCOUNTER — THERAPY VISIT (OUTPATIENT)
Dept: PHYSICAL THERAPY | Facility: CLINIC | Age: 35
End: 2025-02-10
Payer: COMMERCIAL

## 2025-02-10 DIAGNOSIS — G89.29 CHRONIC BILATERAL LOW BACK PAIN WITHOUT SCIATICA: ICD-10-CM

## 2025-02-10 DIAGNOSIS — M54.50 CHRONIC BILATERAL LOW BACK PAIN WITHOUT SCIATICA: ICD-10-CM

## 2025-02-10 DIAGNOSIS — M41.9 SCOLIOSIS OF THORACIC SPINE, UNSPECIFIED SCOLIOSIS TYPE: Primary | ICD-10-CM

## 2025-02-10 PROCEDURE — 97110 THERAPEUTIC EXERCISES: CPT | Mod: GP | Performed by: PHYSICAL THERAPIST
